# Patient Record
Sex: MALE | Race: WHITE | NOT HISPANIC OR LATINO | Employment: OTHER | URBAN - METROPOLITAN AREA
[De-identification: names, ages, dates, MRNs, and addresses within clinical notes are randomized per-mention and may not be internally consistent; named-entity substitution may affect disease eponyms.]

---

## 2017-06-30 ENCOUNTER — OFFICE VISIT - RIVER FALLS (OUTPATIENT)
Dept: FAMILY MEDICINE | Facility: CLINIC | Age: 62
End: 2017-06-30

## 2017-06-30 ASSESSMENT — MIFFLIN-ST. JEOR: SCORE: 1627.42

## 2017-07-01 LAB
CHOLEST SERPL-MCNC: 168 MG/DL (ref 125–200)
CHOLEST/HDLC SERPL: 4 {RATIO}
GLUCOSE BLD-MCNC: 93 MG/DL (ref 65–99)
HDLC SERPL-MCNC: 42 MG/DL
LDLC SERPL CALC-MCNC: 105 MG/DL
NONHDLC SERPL-MCNC: 126 MG/DL
TRIGL SERPL-MCNC: 107 MG/DL

## 2017-10-17 ENCOUNTER — OFFICE VISIT - RIVER FALLS (OUTPATIENT)
Dept: FAMILY MEDICINE | Facility: CLINIC | Age: 62
End: 2017-10-17

## 2018-06-29 ENCOUNTER — COMMUNICATION - RIVER FALLS (OUTPATIENT)
Dept: FAMILY MEDICINE | Facility: CLINIC | Age: 63
End: 2018-06-29

## 2018-06-29 ENCOUNTER — OFFICE VISIT - RIVER FALLS (OUTPATIENT)
Dept: FAMILY MEDICINE | Facility: CLINIC | Age: 63
End: 2018-06-29

## 2018-06-29 ASSESSMENT — MIFFLIN-ST. JEOR: SCORE: 1594.77

## 2018-07-02 LAB
CHOLEST SERPL-MCNC: 152 MG/DL
CHOLEST/HDLC SERPL: 3.6 {RATIO}
GLUCOSE BLD-MCNC: 102 MG/DL (ref 65–99)
HDLC SERPL-MCNC: 42 MG/DL
LDLC SERPL CALC-MCNC: 89 MG/DL
NONHDLC SERPL-MCNC: 110 MG/DL
PSA SERPL-MCNC: 1 NG/ML
TRIGL SERPL-MCNC: 113 MG/DL

## 2019-01-09 ENCOUNTER — OFFICE VISIT - RIVER FALLS (OUTPATIENT)
Dept: FAMILY MEDICINE | Facility: CLINIC | Age: 64
End: 2019-01-09

## 2019-01-09 ASSESSMENT — MIFFLIN-ST. JEOR: SCORE: 1602.02

## 2019-03-20 ENCOUNTER — AMBULATORY - RIVER FALLS (OUTPATIENT)
Dept: FAMILY MEDICINE | Facility: CLINIC | Age: 64
End: 2019-03-20

## 2019-08-27 ENCOUNTER — OFFICE VISIT - RIVER FALLS (OUTPATIENT)
Dept: FAMILY MEDICINE | Facility: CLINIC | Age: 64
End: 2019-08-27

## 2020-02-05 ENCOUNTER — OFFICE VISIT - RIVER FALLS (OUTPATIENT)
Dept: FAMILY MEDICINE | Facility: CLINIC | Age: 65
End: 2020-02-05

## 2020-02-05 ASSESSMENT — MIFFLIN-ST. JEOR: SCORE: 1613.82

## 2020-02-06 ENCOUNTER — COMMUNICATION - RIVER FALLS (OUTPATIENT)
Dept: FAMILY MEDICINE | Facility: CLINIC | Age: 65
End: 2020-02-06

## 2020-02-06 LAB
CHOLEST SERPL-MCNC: 158 MG/DL
CHOLEST/HDLC SERPL: 3.8 {RATIO}
GLUCOSE BLD-MCNC: 97 MG/DL (ref 65–99)
HDLC SERPL-MCNC: 42 MG/DL
LDLC SERPL CALC-MCNC: 96 MG/DL
NONHDLC SERPL-MCNC: 116 MG/DL
PSA SERPL-MCNC: 1.2 NG/ML
TRIGL SERPL-MCNC: 106 MG/DL

## 2020-02-13 ENCOUNTER — RECORDS - HEALTHEAST (OUTPATIENT)
Dept: ADMINISTRATIVE | Facility: OTHER | Age: 65
End: 2020-02-13

## 2020-03-16 ENCOUNTER — HOSPITAL ENCOUNTER (OUTPATIENT)
Dept: CT IMAGING | Facility: CLINIC | Age: 65
Discharge: HOME | End: 2020-03-16

## 2020-03-16 DIAGNOSIS — E78.5 HLD (HYPERLIPIDEMIA): ICD-10-CM

## 2020-03-16 LAB
BSA FOR ECHO PROCEDURE: 1.99 M2
CV CALCIUM SCORE AGATSTON LM: 4
CV CALCIUM SCORING AGATSON LAD: 2
CV CALCIUM SCORING AGATSTON CX: 0
CV CALCIUM SCORING AGATSTON RCA: 14
CV CALCIUM SCORING AGATSTON TOTAL: 20

## 2020-03-16 ASSESSMENT — MIFFLIN-ST. JEOR: SCORE: 1576.85

## 2020-08-17 ENCOUNTER — OFFICE VISIT - RIVER FALLS (OUTPATIENT)
Dept: FAMILY MEDICINE | Facility: CLINIC | Age: 65
End: 2020-08-17

## 2020-08-17 ASSESSMENT — MIFFLIN-ST. JEOR: SCORE: 1630.15

## 2020-08-18 LAB — B BURGDOR IGG+IGM SER QL: <0.9

## 2021-04-19 ENCOUNTER — OFFICE VISIT - RIVER FALLS (OUTPATIENT)
Dept: FAMILY MEDICINE | Facility: CLINIC | Age: 66
End: 2021-04-19

## 2021-04-19 ASSESSMENT — MIFFLIN-ST. JEOR: SCORE: 1632.87

## 2021-06-04 VITALS — BODY MASS INDEX: 26.66 KG/M2 | HEIGHT: 69 IN | WEIGHT: 180 LBS

## 2021-06-17 ENCOUNTER — OFFICE VISIT - RIVER FALLS (OUTPATIENT)
Dept: FAMILY MEDICINE | Facility: CLINIC | Age: 66
End: 2021-06-17

## 2021-06-17 ASSESSMENT — MIFFLIN-ST. JEOR: SCORE: 1631.51

## 2021-06-18 ENCOUNTER — COMMUNICATION - RIVER FALLS (OUTPATIENT)
Dept: FAMILY MEDICINE | Facility: CLINIC | Age: 66
End: 2021-06-18

## 2021-06-18 LAB
CHOLEST SERPL-MCNC: 260 MG/DL
CHOLEST/HDLC SERPL: 6.2 {RATIO}
GLUCOSE BLD-MCNC: 73 MG/DL (ref 65–99)
HDLC SERPL-MCNC: 42 MG/DL
LDLC SERPL CALC-MCNC: 184 MG/DL
NONHDLC SERPL-MCNC: 218 MG/DL
PSA SERPL-MCNC: 1.2 NG/ML
TRIGL SERPL-MCNC: 189 MG/DL

## 2022-02-11 VITALS
SYSTOLIC BLOOD PRESSURE: 116 MMHG | DIASTOLIC BLOOD PRESSURE: 73 MMHG | HEIGHT: 68 IN | HEART RATE: 73 BPM | WEIGHT: 193.4 LBS | TEMPERATURE: 97.6 F | BODY MASS INDEX: 29.31 KG/M2

## 2022-02-11 VITALS
DIASTOLIC BLOOD PRESSURE: 68 MMHG | HEIGHT: 68 IN | HEART RATE: 82 BPM | BODY MASS INDEX: 29.86 KG/M2 | WEIGHT: 197 LBS | TEMPERATURE: 98.6 F | SYSTOLIC BLOOD PRESSURE: 112 MMHG

## 2022-02-11 VITALS
DIASTOLIC BLOOD PRESSURE: 77 MMHG | BODY MASS INDEX: 29.77 KG/M2 | SYSTOLIC BLOOD PRESSURE: 110 MMHG | HEIGHT: 68 IN | HEART RATE: 71 BPM | WEIGHT: 196.4 LBS | TEMPERATURE: 97.4 F

## 2022-02-11 VITALS
OXYGEN SATURATION: 93 % | HEIGHT: 68 IN | DIASTOLIC BLOOD PRESSURE: 68 MMHG | SYSTOLIC BLOOD PRESSURE: 110 MMHG | BODY MASS INDEX: 29.95 KG/M2 | WEIGHT: 197.6 LBS | HEART RATE: 84 BPM | TEMPERATURE: 98.6 F

## 2022-02-11 VITALS
WEIGHT: 193.8 LBS | DIASTOLIC BLOOD PRESSURE: 84 MMHG | TEMPERATURE: 97.9 F | HEART RATE: 71 BPM | SYSTOLIC BLOOD PRESSURE: 122 MMHG | BODY MASS INDEX: 29.91 KG/M2

## 2022-02-11 VITALS
DIASTOLIC BLOOD PRESSURE: 79 MMHG | WEIGHT: 189.2 LBS | HEIGHT: 68 IN | BODY MASS INDEX: 28.67 KG/M2 | TEMPERATURE: 97 F | HEART RATE: 65 BPM | SYSTOLIC BLOOD PRESSURE: 118 MMHG

## 2022-02-11 VITALS
HEIGHT: 68 IN | HEART RATE: 66 BPM | WEIGHT: 190.8 LBS | DIASTOLIC BLOOD PRESSURE: 81 MMHG | BODY MASS INDEX: 28.92 KG/M2 | SYSTOLIC BLOOD PRESSURE: 137 MMHG | TEMPERATURE: 97.3 F

## 2022-02-11 VITALS
WEIGHT: 197.3 LBS | DIASTOLIC BLOOD PRESSURE: 83 MMHG | HEART RATE: 71 BPM | HEIGHT: 68 IN | SYSTOLIC BLOOD PRESSURE: 133 MMHG | BODY MASS INDEX: 29.9 KG/M2 | TEMPERATURE: 97.9 F

## 2022-02-11 VITALS
WEIGHT: 195 LBS | TEMPERATURE: 98.1 F | SYSTOLIC BLOOD PRESSURE: 122 MMHG | DIASTOLIC BLOOD PRESSURE: 74 MMHG | BODY MASS INDEX: 30.09 KG/M2 | HEART RATE: 68 BPM

## 2022-02-16 NOTE — NURSING NOTE
Comprehensive Intake Entered On:  2/5/2020 7:55 AM CST    Performed On:  2/5/2020 7:53 AM CST by Cayla Crawley               Summary   Chief Complaint :   Px   Weight Measured :   193.4 lb(Converted to: 193 lb 6 oz, 87.72 kg)    Height Measured :   67.5 in(Converted to: 5 ft 7 in, 171.45 cm)    Body Mass Index :   29.84 kg/m2 (HI)    Body Surface Area :   2.04 m2   Systolic Blood Pressure :   116 mmHg   Diastolic Blood Pressure :   73 mmHg   Mean Arterial Pressure :   87 mmHg   Peripheral Pulse Rate :   73 bpm   BP Method :   Electronic   HR Method :   Electronic   Temperature Tympanic :   97.6 DegF(Converted to: 36.4 DegC)  (LOW)    Cayla Crawley - 2/5/2020 7:53 AM CST   Health Status   Allergies Verified? :   Yes   Medication History Verified? :   Yes   Pre-Visit Planning Status :   Completed   Tobacco Use? :   Never smoker   Cayla Crawley - 2/5/2020 7:53 AM CST

## 2022-02-16 NOTE — PROGRESS NOTES
Patient:   YENI ANGULO            MRN: [id number]            FIN: [id number]               Age:   63 years     Sex:  Male     :  1955   Associated Diagnoses:   Annual exam; HLD (Hyperlipidemia); Family History of Prostate Cancer; DJD (Degenerative Joint Disease); Asthma, Mild Intermittent; Family History of Colon Cancer; Injury of shoulder, left   Author:   Will Morocho MD      Visit Information      Date of Service: 2019 04:20 pm  Performing Location: [facility]  Encounter#: [id number]      Primary Care Provider (PCP):  Will Morocho MD    NPLENORA# [id number]      Referring Provider:  Will Morocho MD# [id number]      Chief Complaint   2019 4:41 PM CST     Px     Routine Health Care Visit      History of Present Illness   Doing well  Injured left shoulder several yrs ago siiing Loss of motion getting worse   Meds as directed             The patient presents for a general exam.  The patient's general health status is described as good.  The patient's diet is described as balanced.  Exercise:  none.  Associated symptoms consist of none.  Additional pertinent history: occasional caffeine use, tobacco use none and alcohol use socially.  no inhaler use 3 years.        Review of Systems   Constitutional:  Negative.    Eye:  Negative.    Ear/Nose/Mouth/Throat:  Negative.    Respiratory:  Negative.    Cardiovascular:  Negative.    Gastrointestinal:  Negative.    Genitourinary:  Negative.    Hematology/Lymphatics:  Negative.    Endocrine:  Negative.    Immunologic:  Negative.    Musculoskeletal:  Negative.    Integumentary:  Negative.    Neurologic:  Negative.    Psychiatric:  Negative.    All other systems reviewed and negative      Health Status   Allergies:    Allergic Reactions (Selected)  No known allergies   Medications:  (Selected)   Prescriptions  Prescribed  ZyrTEC 10 mg oral tablet: 1 tab(s) ( 10 mg ), po, daily, # 90 tab(s), 3 Refill(s), Type: Maintenance,  Pharmacy: [facility], 1 tab(s) Oral daily  simvastatin 40 mg oral tablet: 1 tab(s) ( 40 mg ), po, hs, # 90 tab(s), 3 Refill(s), Type: Maintenance, Pharmacy: [facility], 1 tab(s) Oral hs  Documented Medications  Documented  Daily Multiple Vitamins oral tablet: 1 tab(s), po, daily, 0 Refill(s), Type: Maintenance  aspirin 81 mg oral tablet: 1 tab(s) ( 81 mg ), po, daily, tab(s), 0 Refill(s), Type: Maintenance,    Medications          *denotes recorded medication          *aspirin 81 mg oral tablet: 81 mg, 1 tab(s), po, daily, tab(s).          ZyrTEC 10 mg oral tablet: 10 mg, 1 tab(s), po, daily, 90 tab(s), 3 Refill(s).          *Daily Multiple Vitamins oral tablet: 1 tab(s), po, daily.          simvastatin 40 mg oral tablet: 40 mg, 1 tab(s), po, hs, 90 tab(s), 3 Refill(s).   Problem list:    All Problems  Adenomatous colon polyp / SNOMED CT 9903338689 / Confirmed  Family history of colon cancer / SNOMED CT 760261054 / Confirmed  Hearing loss / SNOMED CT 13503981 / Confirmed  HLD (hyperlipidemia) / SNOMED CT 65917569 / Confirmed  Asthma, mild intermittent / SNOMED CT 0998062726 / Confirmed  Obesity / SNOMED CT 6460355893 / Probable  DJD (degenerative joint disease) / SNOMED CT 1720682472 / Confirmed  Seasonal allergies / SNOMED CT 511938127 / Confirmed  Skin lesions / SNOMED CT 912432291 / Confirmed  Squamous cell carcinoma in situ / SNOMED CT 488434304 / Confirmed  Resolved: Anxiety / SNOMED CT 8375509710  Resolved: CT Cardiac Calcium Score      Histories   Past Medical History:    Active  Squamous cell carcinoma in situ (107070287): Onset on 1/6/2017 at 61 years.  Comments:  1/11/2017 CST 12:54 PM Elisa Serrano  Skin, vale of antihelix, right, superior.  Skin lesions (558790612): Onset on 1/5/2017 at 61 years.  Comments:  1/11/2017 CST 12:52 PM Elisa Serrano  Lentigines, benign nevi, seborrheic keratoses, cherry angiomas.  Asthma, mild intermittent  (9578933113): Onset on 2011 at 55 years.  Comments:  2011 CST 3:34 PM CST -     HLD (hyperlipidemia) (52936946)  Family history of colon cancer (049529011)  DJD (degenerative joint disease) (8860047260)  Obesity (3667966135)  Seasonal allergies (491990129)  Hearing loss (37973829)  Resolved  Anxiety (0235801295):  Resolved.  CT Cardiac Calcium Score:  Resolved.  Comments:  2013 CDT 6:03 PM CDT - Juarez Cayla ERAZO  Date of test: 2013   Score: 1   Family History:    Cancer  Father (Chava, )  Comments:  2011 3:32 PM CST - Allen TATUM, Emmnauelle  Bone  CA - Cancer of colon  Grandfather (P)  Comments:  2011 11:02 AM CST - Elisa Aguilera  In his 60s.  Cancer of colon  Mother ()  Cancer of prostate  Father (Chava, )     Procedure history:    Shave biopsy of skin lesion (SNOMED CT 381150733) on 2017 at 61 Years.  Comments:  2017 12:56 PM CST Elisa Gamez  Skin, vale of antihelix, right, superior revealed squamous cell ca in situ.    2017 12:49 PM Elisa Serrano  Left distal calf, right knee, right superior vale of antihelix, right lateral temple.  Colonoscopy (SNOMED CT 322332670) performed by Hernandez Alford MD on 3/25/2015 at 60 Years.  Comments:  3/26/2015 3:41 PM CDT - Hernandez Alford MD  Indication: First degree relative with colon cancer <60  Sedation: 3 mg Versed, 150 mcg Fentanyl  Findings: Single divertiulum, Small tubular adenoma 50 cm  Repeat in 5 years  Colonoscopy (SNOMED CT 727386558) in the month of 2009 at 54 Years.  Appendectomy (SNOMED CT 126894551).  Right Knee Arthroscopy (open).   Social History:        Alcohol Assessment            Current, 4-5 times per week, 2 drinks/episode average.      Tobacco Assessment            Former smoker      Substance Abuse Assessment            Past, Marijuana      Employment and Education Assessment            Employed, Work/School description: .      Home and Environment  Assessment            Marital status: .  Spouse/Partner name: Elisa.  Risks in environment: owns secured gun.      Nutrition and Health Assessment            Type of diet: Regular.      Exercise and Physical Activity Assessment            Exercise frequency: Never.      Physical Examination   Vital Signs   1/9/2019 4:41 PM CST Temperature Tympanic 97.3 DegF  LOW    Peripheral Pulse Rate 66 bpm    HR Method Electronic    Systolic Blood Pressure 137 mmHg  HI    Diastolic Blood Pressure 81 mmHg  HI    Mean Arterial Pressure 100 mmHg    BP Method Electronic      Measurements from flowsheet : Measurements   1/9/2019 4:41 PM CST Height Measured - Standard 67.5 in    Weight Measured - Standard 190.8 lb    BSA 2.03 m2    Body Mass Index 29.44 kg/m2  HI      General:  Alert and oriented.    Eye:  Pupils are equal, round and reactive to light, Normal conjunctiva.    HENT:  Normocephalic, Tympanic membranes are clear, Oral mucosa is moist.    Neck:  Supple, Non-tender, No lymphadenopathy, No thyromegaly.    Respiratory:  Breath sounds are equal, Symmetrical chest wall expansion.         Respirations: Are within normal limits.         Pattern: Regular.         Breath sounds: Bilateral, Within normal limits.    Cardiovascular:  Normal rate, Regular rhythm, No murmur, Good pulses equal in all extremities, Normal peripheral perfusion, No edema.    Breast:  No mass.         Lymph nodes: Within normal limits.    Gastrointestinal:  Soft, Non-tender, Non-distended, Normal bowel sounds.    Musculoskeletal:  degenerative changes noted.         Upper extremity exam: Shoulder ( Left, Tenderness, Range of motion ( Diminished ), pain with rtc stressing ).    Integumentary:  Warm, Dry.    Neurologic:  No focal deficits.    Cognition and Speech:  Oriented, Speech clear and coherent.    Psychiatric:  Appropriate mood & affect, Normal judgment.       Health Maintenance      Recommendations     Pending (in the next year)        Due             Abdominal Aortic Aneurysm Screen (if hx of smoking) due  01/09/19  One-time only           Fall Risk Screen (Male) due  01/09/19  and every 1  year(s)           Hepatitis C Screen 3307-1413 (Male) due  01/09/19  One-time only           Lung Cancer Screen (Male) due  01/09/19  and every 1  year(s)           Type 2 Diabetes Mellitus Screen (Male) due  01/09/19  Variable frequency           Zoster/Shingles Vaccine due  01/09/19  One-time only        Due In Future            Depression Screen (Male) not due until  06/29/19  and every 1  year(s)           Lipid Disorders Screen (Male) not due until  06/29/19  and every 1  year(s)           Influenza Vaccine not due until  12/01/19  and every 1  year(s)     Satisfied (in the past 1 year)        Satisfied            Alcohol Misuse Screen (Male) on  06/29/18.           Body Mass Index Check (Male) on  01/09/19.           Body Mass Index Check (Male) on  06/29/18.           Depression Screen (Male) on  06/29/18.           Depression Screen (Male) on  06/29/18.           Depression Screen (Male) on  06/29/18.           High Blood Pressure Screen (Male) on  01/09/19.           High Blood Pressure Screen (Male) on  06/29/18.           Influenza Vaccine on  12/01/18.           Lipid Disorders Screen (Male) on  06/29/18.           Lipid Disorders Screen (Male) on  06/29/18.           Lipid Disorders Screen (Male) on  06/29/18.           Lipid Disorders Screen (Male) on  06/29/18.           Obesity Screen and Counseling (Male) on  01/09/19.           Obesity Screen and Counseling (Male) on  06/29/18.           Pneumococcal Vaccine on  06/29/18.           Tobacco Use Screen (Male) on  01/09/19.           Tobacco Use Screen (Male) on  06/29/18.      Impression and Plan   Diagnosis     Annual exam (LQP51-BN Z00.00).     HLD (Hyperlipidemia) (BMV67-EF E78.5).     Family History of Prostate Cancer (GCO81-VS Z80.42).     DJD (Degenerative Joint Disease) (RPX76-LB Q89.8).     Asthma,  Mild Intermittent (OSL55-BI J45.20).     Family History of Colon Cancer (GSY84-ME Z80.0).     Injury of shoulder, left (XHQ02-NB S49.92XA).     Course:  Not progressing as expected.    Plan:  uses inh seasonally not needed for 3 years  doing great  BMI,Weight loss,exercise,diet discussed  cage/phq9 reviewed with patient  mri shoulder.    Patient Instructions:       Counseled: Patient, Diet, Activity, Verbalized understanding.    Counseled:  Patient, Routine exercise and healthy weight maintenance discussed.    Patient Instructions:  Return to clinic in one year for next routine health visit, or sooner if problems or concerns.

## 2022-02-16 NOTE — PROGRESS NOTES
Patient:   YENI ANGULO            MRN: [id number]            FIN: [id number]               Age:   63 years     Sex:  Male     :  1955   Associated Diagnoses:   Annual exam; HLD (Hyperlipidemia); Family History of Prostate Cancer; DJD (Degenerative Joint Disease); Asthma, Mild Intermittent; Family History of Colon Cancer   Author:   Will Morocho MD      Visit Information      Date of Service: 2018 07:57 am  Performing Location: [facility]  Encounter#: [id number]      Primary Care Provider (PCP):  Will Morocho MD    NPLENORA# [id number]      Referring Provider:  Will Morocho MD# [id number]      Chief Complaint   2018 8:15 AM CDT    Px     Routine Health Care Visit      History of Present Illness   Doing well   no concerns   Meds as directed             The patient presents for a general exam.  The patient's general health status is described as good.  The patient's diet is described as balanced.  Exercise:  none.  Associated symptoms consist of none.  Additional pertinent history: occasional caffeine use, tobacco use none and alcohol use socially.  no inhaler use 3 years.        Review of Systems   Constitutional:  Negative.    Eye:  Negative.    Ear/Nose/Mouth/Throat:  Negative.    Respiratory:  Negative.    Cardiovascular:  Negative.    Gastrointestinal:  Negative.    Genitourinary:  Negative.    Hematology/Lymphatics:  Negative.    Endocrine:  Negative.    Immunologic:  Negative.    Musculoskeletal:  Negative.    Integumentary:  Negative.    Neurologic:  Negative.    Psychiatric:  Negative.    All other systems reviewed and negative      Health Status   Allergies:    Allergic Reactions (Selected)  No known allergies   Medications:  (Selected)   Prescriptions  Prescribed  ZyrTEC 10 mg oral tablet: 1 tab(s) ( 10 mg ), po, daily, # 90 tab(s), 3 Refill(s), Type: Maintenance, Pharmacy: [facility], 1 tab(s) po daily  simvastatin 40 mg oral  tablet: 1 tab(s) ( 40 mg ), po, hs, # 90 tab(s), 3 Refill(s), Type: Maintenance, Pharmacy: [facility] [place], 1 tab(s) po hs  Documented Medications  Documented  Daily Multiple Vitamins oral tablet: 1 tab(s), po, daily, 0 Refill(s), Type: Maintenance  aspirin 81 mg oral tablet: 1 tab(s) ( 81 mg ), po, daily, tab(s), 0 Refill(s), Type: Maintenance,    Medications          *denotes recorded medication          *aspirin 81 mg oral tablet: 81 mg, 1 tab(s), po, daily, tab(s).          ZyrTEC 10 mg oral tablet: 10 mg, 1 tab(s), po, daily, 90 tab(s), 3 Refill(s).          *Daily Multiple Vitamins oral tablet: 1 tab(s), po, daily.          simvastatin 40 mg oral tablet: 40 mg, 1 tab(s), po, hs, 90 tab(s), 3 Refill(s).     Problem list:    All Problems (Selected)  Adenomatous colon polyp / SNOMED CT 6423735997 / Confirmed  Family history of colon cancer / SNOMED CT 143125381 / Confirmed  Hearing loss / SNOMED CT 94013826 / Confirmed  HLD (hyperlipidemia) / SNOMED CT 88144782 / Confirmed  Asthma, mild intermittent / SNOMED CT 8840825495 / Confirmed  Obesity / SNOMED CT 2721440352 / Probable  DJD (degenerative joint disease) / SNOMED CT 5117100912 / Confirmed  Seasonal allergies / SNOMED CT 711855977 / Confirmed  Skin lesions / SNOMED CT 765936677 / Confirmed  Squamous cell carcinoma in situ / SNOMED CT 977666165 / Confirmed      Histories   Past Medical History:    Active  Squamous cell carcinoma in situ (511323319): Onset on 1/6/2017 at 61 years.  Comments:  1/11/2017 CST 12:54 PM CST - Elisa Aguilera  Skin, vale of antihelix, right, superior.  Skin lesions (036490570): Onset on 1/5/2017 at 61 years.  Comments:  1/11/2017 CST 12:52 PM CST - Elisa Aguilera  Lentigines, benign nevi, seborrheic keratoses, cherry angiomas.  Asthma, mild intermittent (3810146188): Onset on 1/21/2011 at 55 years.  Comments:  1/21/2011 CST 3:34 PM CST -     HLD (hyperlipidemia) (18577224)  Family history of colon cancer  (956094765)  DJD (degenerative joint disease) (9246621645)  Obesity (0781806238)  Seasonal allergies (797433517)  Hearing loss (23760552)  Resolved  Anxiety (3092128330):  Resolved.  CT Cardiac Calcium Score:  Resolved.  Comments:  2013 CDT 6:03 PM CDT - Cayla Crawley  Date of test: 2013   Score: 1   Family History:    Cancer  Father (Chava, )  Comments:  2011 3:32 PM - Allen TATUM, Emmanuelle  Bone  CA - Cancer of colon  Grandfather (P)  Comments:  2011 11:02 AM - Elisa Aguilera  In his 60s.  Cancer of colon  Mother ()  Cancer of prostate  Father (Chava, )     Procedure history:    Shave biopsy of skin lesion (SNOMED CT 744193565) on 2017 at 61 Years.  Comments:  2017 12:56 PM - Elisa Aguilera  Skin, vale of antihelix, right, superior revealed squamous cell ca in situ.    2017 12:49 PM - Elisa Aguilera  Left distal calf, right knee, right superior vale of antihelix, right lateral temple.  Colonoscopy (SNOMED CT 893155531) performed by Hernandez Alford MD on 3/25/2015 at 60 Years.  Comments:  3/26/2015 3:41 PM - Hernandez Alford MD  Indication: First degree relative with colon cancer <60  Sedation: 3 mg Versed, 150 mcg Fentanyl  Findings: Single divertiulum, Small tubular adenoma 50 cm  Repeat in 5 years  Colonoscopy (SNOMED CT 879478604) in the month of 2009 at 54 Years.  Appendectomy (SNOMED CT 137665945).  Right Knee Arthroscopy (open).   Social History:        Alcohol Assessment            Current, 3-4 times per week, 2 drinks/episode average.  3 drinks/episode maximum.      Tobacco Assessment            Former smoker      Substance Abuse Assessment            Past, Marijuana      Employment and Education Assessment            Employed, Work/School description: .      Home and Environment Assessment            Marital status: .  Spouse/Partner name: Elisa.  Risks in environment: owns secured gun.      Nutrition and Health  Assessment            Type of diet: Regular.      Exercise and Physical Activity Assessment            Exercise frequency: Never.        Physical Examination   Vital Signs   6/29/2018 8:15 AM CDT Temperature Tympanic 97.0 DegF  LOW    Peripheral Pulse Rate 65 bpm    HR Method Electronic    Systolic Blood Pressure 118 mmHg    Diastolic Blood Pressure 79 mmHg    Mean Arterial Pressure 92 mmHg    BP Method Electronic      Measurements from flowsheet : Measurements   6/29/2018 8:15 AM CDT Height Measured - Standard 67.5 in    Weight Measured - Standard 189.2 lb    BSA 2.02 m2    Body Mass Index 29.19 kg/m2  HI      General:  Alert and oriented.    Eye:  Pupils are equal, round and reactive to light, Normal conjunctiva.    HENT:  Normocephalic, Tympanic membranes are clear, Oral mucosa is moist.    Neck:  Supple, Non-tender, No lymphadenopathy, No thyromegaly.    Respiratory:  Breath sounds are equal, Symmetrical chest wall expansion.         Respirations: Are within normal limits.         Pattern: Regular.         Breath sounds: Bilateral, Within normal limits.    Cardiovascular:  Normal rate, Regular rhythm, No murmur, Good pulses equal in all extremities, Normal peripheral perfusion, No edema.    Breast:  No mass.         Lymph nodes: Within normal limits.    Gastrointestinal:  Soft, Non-tender, Non-distended, Normal bowel sounds.    Musculoskeletal:  No tenderness, No swelling, degenerative changes noted.    Integumentary:  Warm, Dry.    Neurologic:  No focal deficits.    Cognition and Speech:  Oriented, Speech clear and coherent.    Psychiatric:  Appropriate mood & affect, Normal judgment.       Health Maintenance      Recommendations     Pending (in the next year)        OverDue           Influenza Vaccine due  12/10/17  and every 1  year(s)        Due            Abdominal Aortic Aneurysm Screen (if hx of smoking) due  06/29/18  One-time only           Fall Risk Screen (Male) due  06/29/18  and every 1  year(s)            Hepatitis C Screen 1843-1199 (Male) due  06/29/18  One-time only           Lung Cancer Screen (Male) due  06/29/18  and every 1  year(s)           Zoster/Shingles Vaccine due  06/29/18  One-time only        Near Due            Lipid Disorders Screen (Male) near due  06/30/18  and every 1  year(s)           Depression Screen (Male) near due  06/30/18  and every 1  year(s)     Satisfied (in the past 1 year)        Satisfied            Alcohol Misuse Screen (Male) on  06/30/17.           Body Mass Index Check (Male) on  06/29/18.           Body Mass Index Check (Male) on  06/30/17.           Depression Screen (Male) on  06/30/17.           Depression Screen (Male) on  06/30/17.           Depression Screen (Male) on  06/30/17.           High Blood Pressure Screen (Male) on  06/29/18.           High Blood Pressure Screen (Male) on  10/17/17.           High Blood Pressure Screen (Male) on  06/30/17.           Lipid Disorders Screen (Male) on  06/30/17.           Lipid Disorders Screen (Male) on  06/30/17.           Lipid Disorders Screen (Male) on  06/30/17.           Lipid Disorders Screen (Male) on  06/30/17.           Obesity Screen and Counseling (Male) on  06/29/18.           Obesity Screen and Counseling (Male) on  10/17/17.           Obesity Screen and Counseling (Male) on  06/30/17.           Tobacco Use Screen (Male) on  06/29/18.           Tobacco Use Screen (Male) on  10/17/17.           Tobacco Use Screen (Male) on  06/30/17.          Impression and Plan   Diagnosis     Annual exam (ZPN10-WV Z00.00).     HLD (Hyperlipidemia) (DBD95-VU E78.5).     Family History of Prostate Cancer (JNJ48-SN Z80.42).     DJD (Degenerative Joint Disease) (ISZ26-JV Q89.8).     Asthma, Mild Intermittent (BEF61-EP J45.20).     Family History of Colon Cancer (TFZ01-DJ Z80.0).     Plan:  uses inh seasonally not needed for 3 years  doing great  BMI,Weight loss,exercise,diet discussed  cage/phq9 reviewed with patient.    Patient  Instructions:       Counseled: Patient, Diet, Activity, Verbalized understanding.    Counseled:  Patient, Routine exercise and healthy weight maintenance discussed.    Patient Instructions:  Return to clinic in one year for next routine health visit, or sooner if problems or concerns.

## 2022-02-16 NOTE — TELEPHONE ENCOUNTER
---------------------  From: Franci Flannery CMA   Sent: 8/2/2019 9:26:55 AM CDT  Subject: General Message-Shinrix     LM for pt at 0926 to c/b to set up his 2nd Shinrix vaccine

## 2022-02-16 NOTE — TELEPHONE ENCOUNTER
---------------------  From: Abbey Dvais CMA (Phone Messages Pool ([id number]_[facility]))   Sent: 8/3/2020 9:24:32 AM CDT  Subject: vaccines     Phone message    PCP: DIANDRA     Person calling: Hubert  Phone number: [phone]  Time message left: 0839  Return call time: 0920    Reason: Hubert called and left a message wondering when his last shingles vaccine, pneumonia and flu were given.   Shingles: 3/20/19 and 8/27/19  Flu: 12/20/2019  pneumonia: pneumovax 3/17/2003 and Prevnar 6/29/2018    Hubert was notified and will mail copy of vaccines per request.       Transferred to:             IRIS Davis CMA

## 2022-02-16 NOTE — NURSING NOTE
Depression Screening Entered On:  1/10/2019 8:36 AM CST    Performed On:  1/9/2019 8:36 AM CST by Akua Pelayo               Depression Screening   Feeling Down, Depressed, Hopeless :   Not at all   Little Interest - Pleasure in Activities :   Not at all   Initial Depression Screen Score :   0    Trouble Falling or Staying Asleep :   Several days   Feeling Tired or Little Energy :   Not at all   Poor Appetite or Overeating :   Not at all   Feeling Bad About Yourself :   Not at all   Trouble Concentrating :   Not at all   Moving or Speaking Slowly :   Not at all   Thoughts Better Off Dead or Hurting Self :   Not at all   Detailed Depression Screen Score :   1    Total Depression Screen Score :   1    VERONIQUE Difficulty with Work, Home, Others :   Not difficult at all   Akua Pelayo - 1/10/2019 8:36 AM CST

## 2022-02-16 NOTE — TELEPHONE ENCOUNTER
Patient Information     Name:YENI ANGULO      Address:      [id number] [place] [id number]     Sex:Male     YOB: 1955     [phone]     Emergency Contact:ANTIONE ANGULO     MRN:[id number]     FIN:[id number]     Location:[facility]     Date of Service:08/17/2020      Primary Care Physician:       Will Morocho MD, [phone]      Attending Physician:       Estephania Abrams PA-C, [phone]   Pt notified by phone of negative lyme test.

## 2022-02-16 NOTE — LETTER
(Inserted Image. Unable to display)     July 22, 2019      YENI ANGULO  [id number], [place] [id number]          Dear YENI,      Thank you for selecting [facility] (previously [facility]) for your healthcare needs.      Our records indicate you are due for the following services:     Immunizations:  Shingrix vaccine.      To schedule an appointment or if you have further questions, please contact your primary clinic:   [facility]       [phone]   [facility]       [phone]              [facility]     [phone]      Powered by [employer] and [employer]    Sincerely,    Will Morocho MD

## 2022-02-16 NOTE — PROGRESS NOTES
Patient:   YENI ANGULO            MRN: [id number]            FIN: [id number]               Age:   66 years     Sex:  Male     :  1955   Associated Diagnoses:   Annual exam; HLD (Hyperlipidemia); Family History of Prostate Cancer; DJD (Degenerative Joint Disease); Asthma, Mild Intermittent; Family History of Colon Cancer   Author:   Will Morocho MD      Visit Information      Date of Service: 2021 07:42 am  Performing Location: [facility]  Encounter#: [id number]      Primary Care Provider (PCP):  Will Morocho MD    NPI# [id number]      Referring Provider:  Will Morocho MD# [id number]      Chief Complaint   2021 7:51 AM CDT    Px     Routine Health Care Visit      History of Present Illness   Patient is in today for annual exam and follow-up.  Overall he is doing great tolerating atorvastatin no issues with his breathing allergies are well controlled continues to be very active still working full-time wife is in good health.  No tobacco use         Review of Systems   Constitutional:  Negative.    Eye:  Negative.    Ear/Nose/Mouth/Throat:  Negative.    Respiratory:  Negative.    Cardiovascular:  Negative.    Gastrointestinal:  Negative.    Genitourinary:  Negative.    Hematology/Lymphatics:  Negative.    Endocrine:  Negative.    Immunologic:  Negative.    Musculoskeletal:  Negative.    Integumentary:  Negative.    Neurologic:  Negative.    Psychiatric:  Negative.    All other systems reviewed and negative      Health Status   Allergies:    Allergic Reactions (Selected)  No known allergies   Medications:  (Selected)   Prescriptions  Prescribed  ZyrTEC 10 mg oral tablet: = 1 tab(s) ( 10 mg ), po, daily, # 90 tab(s), 3 Refill(s), Type: Maintenance, Pharmacy: [facility], 1 tab(s) Oral daily, 67.5, in, 21 7:51:00 CDT, Height Measured, 197.3, lb, 21 7:51:00 CDT, Weight Measured  atorvastatin 40 mg oral tablet: = 1 tab(s),  Oral, daily, # 90 tab(s), 3 Refill(s), Type: Maintenance, Pharmacy: [facility], 1 tab(s) Oral daily, 67.5, in, 06/17/21 7:51:00 CDT, Height Measured, 197.3, lb, 06/17/21 7:51:00 CDT, Weight Measured  Documented Medications  Documented  Daily Multiple Vitamins oral tablet: 1 tab(s), po, daily, 0 Refill(s), Type: Maintenance  aspirin 81 mg oral tablet: 1 tab(s) ( 81 mg ), po, daily, tab(s), 0 Refill(s), Type: Maintenance,    Medications          *denotes recorded medication          *aspirin 81 mg oral tablet: 81 mg, 1 tab(s), po, daily, tab(s).          atorvastatin 40 mg oral tablet: 1 tab(s), Oral, daily, 90 tab(s), 3 Refill(s).          ZyrTEC 10 mg oral tablet: 10 mg, 1 tab(s), po, daily, 90 tab(s), 3 Refill(s).          *Daily Multiple Vitamins oral tablet: 1 tab(s), po, daily.       Problem list:    All Problems (Selected)  HLD (hyperlipidemia) / SNOMED CT 24813867 / Confirmed  Seasonal allergies / SNOMED CT 433202777 / Confirmed  Family history of colon cancer / SNOMED CT 513539661 / Confirmed  Squamous cell carcinoma in situ / SNOMED CT 183589252 / Confirmed  Adenomatous colon polyp / SNOMED CT 9700335990 / Confirmed  Asthma, mild intermittent / SNOMED CT 3184231858 / Confirmed  Hearing loss / SNOMED CT 76339734 / Confirmed  Obesity / SNOMED CT 8608514158 / Probable  DJD (degenerative joint disease) / SNOMED CT 5830949475 / Confirmed  Skin lesions / SNOMED CT 157477892 / Confirmed      Histories   Past Medical History:    Active  Squamous cell carcinoma in situ (616169874): Onset on 1/6/2017 at 61 years.  Comments:  1/11/2017 CST 12:54 PM Elisa Serrano  Skin, vale of antihelix, right, superior.  Skin lesions (452701363): Onset on 1/5/2017 at 61 years.  Comments:  1/11/2017 CST 12:52 PM CST - Lincoln , Elisa  Lentigines, benign nevi, seborrheic keratoses, cherry angiomas.  Asthma, mild intermittent (4224494708): Onset on 1/21/2011 at 55 years.  Comments:  1/21/2011 CST 3:34 PM  CST -     HLD (hyperlipidemia) (73216650)  Family history of colon cancer (926523253)  DJD (degenerative joint disease) (4984723160)  Obesity (4481127496)  Seasonal allergies (843343883)  Hearing loss (45676356)  Resolved  Anxiety (8216634081):  Resolved.  CT Cardiac Calcium Score:  Resolved.  Comments:  2013 CDT 6:03 PM CDT - Juarez Cayla ERAZO  Date of test: 2013   Score: 1   Family History:    Cancer  Father (Chava, )  Comments:  2011 3:32 PM CST - Allen TATUM, Emmanuelle  Bone  CA - Cancer of colon  Grandfather (P)  Comments:  2011 11:02 AM CST - Elisa Aguilera  In his 60s.  Cancer of colon  Mother ()  Cancer of prostate  Father (Chava, )     Procedure history:    Cardiac computed tomography for calcium scoring (SNOMED CT 8608719241) on 3/16/2020 at 65 Years.  Comments:  3/16/2020 2:37 PM CDT - Marisa Tillman  Total Agatston calcium score is 20.  Colonoscopy (SNOMED CT 496013506) performed by Neville Jones MD on 3/5/2020 at 64 Years.  Comments:  3/17/2020 2:13 PM CDT - Saw INGRAM, Natali  Results:  tubular adenoma x3, negative for dysplasia.  Recommendation:  f/u 5yrs    3/9/2020 1:15 PM CDT - Guerda Serna  Indication: History of colonic polyps.  Sedation: MAC.  Impression: One 2 mm polyp in cecum.  One 3 mm polyp in transverse colon.  One 3 mm polyp in descending colon.  Diverticulosis in sigmoid colon.  Perianal skin tags.  Shave biopsy of skin lesion (SNOMED CT 561832058) on 2017 at 61 Years.  Comments:  2017 12:56 PM Elisa Serrano  Skin, vale of antihelix, right, superior revealed squamous cell ca in situ.    2017 12:49 PM CST Elisa Gamez  Left distal calf, right knee, right superior vale of antihelix, right lateral temple.  Colonoscopy (SNOMED CT 905521088) performed by Hernandez Alford MD on 3/25/2015 at 60 Years.  Comments:  3/26/2015 3:41 PM SERGE - Hernandez Alford MD  Indication: First degree relative with colon cancer  <60  Sedation: 3 mg Versed, 150 mcg Fentanyl  Findings: Single divertiulum, Small tubular adenoma 50 cm  Repeat in 5 years  Colonoscopy (SNOMED CT 737476162) in the month of 12/2009 at 54 Years.  Appendectomy (SNOMED CT 842925103).  Right Knee Arthroscopy (open).   Social History:        Electronic Cigarette/Vaping Assessment            Electronic Cigarette Use: Never.      Alcohol Assessment            Current, 4-5 times per week, 2 drinks/episode average.      Tobacco Assessment            Former smoker, quit more than 30 days ago      Substance Abuse Assessment            Past, Marijuana      Employment and Education Assessment            Employed, Work/School description: .      Home and Environment Assessment            Marital status: .  Spouse/Partner name: Elisa.  Risks in environment: owns secured gun.      Nutrition and Health Assessment            Type of diet: Regular.      Exercise and Physical Activity Assessment            Exercise frequency: Never.        Physical Examination   Vital Signs   6/17/2021 7:51 AM CDT Temperature Oral 97.9 DegF    Peripheral Pulse Rate 71 bpm    HR Method Electronic    Systolic Blood Pressure 133 mmHg  HI    Diastolic Blood Pressure 83 mmHg  HI    Mean Arterial Pressure 100 mmHg    BP Method Electronic      Measurements from flowsheet : Measurements   6/17/2021 7:51 AM CDT Height Measured - Standard 67.5 in    Weight Measured - Standard 197.3 lb    BSA 2.06 m2    Body Mass Index 30.44 kg/m2  HI      General:  Alert and oriented.    Eye:  Pupils are equal, round and reactive to light, Normal conjunctiva.    HENT:  Normocephalic, Tympanic membranes are clear, Oral mucosa is moist.    Neck:  Supple, Non-tender, No lymphadenopathy, No thyromegaly.    Respiratory:  Breath sounds are equal, Symmetrical chest wall expansion.         Respirations: Are within normal limits.         Pattern: Regular.         Breath sounds: Bilateral, Within normal limits.     Cardiovascular:  Normal rate, Regular rhythm, No murmur, Good pulses equal in all extremities, Normal peripheral perfusion, No edema.    Gastrointestinal:  Soft, Non-tender, Non-distended, Normal bowel sounds.    Musculoskeletal:  degenerative changes noted.    Integumentary:  Warm, Dry.    Neurologic:  No focal deficits.    Cognition and Speech:  Oriented, Speech clear and coherent.    Psychiatric:  Appropriate mood & affect, Normal judgment.       Health Maintenance      Recommendations     Pending (in the next year)        OverDue           Depression Screen due  01/09/20  and every 1  year(s)           Lipid Disorders Screen due  02/05/21  and every 1  year(s)        Due            Abdominal Aortic Aneurysm Screen (if hx of smoking) due  06/17/21  One-time only           Fall Risk Screen due  06/17/21  and every 1  year(s)           Hepatitis C Screen 7413-3954 due  06/17/21  One-time only           Lung Cancer Screen due  06/17/21  and every 1  year(s)        Due In Future            Influenza Vaccine not due until  09/01/21  and every 1  year(s)     Satisfied (in the past 1 year)        Satisfied            Body Mass Index Check on  06/17/21.           Body Mass Index Check on  04/19/21.           Body Mass Index Check on  08/17/20.           COVID-19 Vaccine (Pfizer) Dose 2 on  03/24/21.           COVID-19 Vaccine (Pfizer) Dose 1 on  03/03/21.           High Blood Pressure Screen on  06/17/21.           High Blood Pressure Screen on  04/19/21.           High Blood Pressure Screen on  08/17/20.           Influenza Vaccine on  10/28/20.           Obesity Screen and Counseling on  06/17/21.           Obesity Screen and Counseling on  04/19/21.           Obesity Screen and Counseling on  08/17/20.           Tobacco Use Screen (Male) on  06/17/21.           Tobacco Use Screen (Male) on  04/19/21.           Tobacco Use Screen (Male) on  08/17/20.          Impression and Plan   Diagnosis     Annual exam (XLR67-EU  Z00.00).     HLD (Hyperlipidemia) (ABD04-UI E78.5).     Family History of Prostate Cancer (NVG32-QR Z80.42).     DJD (Degenerative Joint Disease) (QUZ39-GK Q89.8).     Asthma, Mild Intermittent (DVH70-TE J45.20).     Family History of Colon Cancer (SJS66-VB Z80.0).     Course:  Progressing as expected.    Plan:  Problem or 1 hyperlipidemia controlled on atorvastatin  Due to seasonal allergies controlled with Zyrtec  3.  Family history prostate cancer will get yearly PSAs  4.  Family history of colon cancer and history of colon polyps due for colonoscopy in 4 years  5.  Remote history of asthma no problems for at least 5 years now  6.  Presbycusis has hearing aids  .    Patient Instructions:       Counseled: Patient, Regarding diagnosis, Regarding treatment, Regarding medications, Diet, Activity, Verbalized understanding.    Counseled:  Patient, Routine exercise and healthy weight maintenance discussed.    Patient Instructions:  Return to clinic in one year for next routine health visit, or sooner if problems or concerns.

## 2022-02-16 NOTE — LETTER
(Inserted Image. Unable to display)     February 08, 2021      YENI ANGULO  [id number] [place], [place] [id number]          Dear YENI,      Thank you for selecting [facility] (previously [facility]) for your healthcare needs.    Our records indicate you are due for the following services:    Annual Physical.    Fasting Lab Tests ~ Please do not eat or drink anything 10 hours prior to your scheduled appointment time.  (Water and any medications that you may need are allowed unless directed otherwise.)    If you had your labs done at another facility or with Direct Access Lab Testing at [facility], please bring in a copy of the results to your next visit, mail a copy, or drop off a copy of your results to your Healthcare Provider.    (FYI   Regarding office visits: In some instances, a video visit or telephone visit may be offered as an option.)    You are due for lab work and an office visit; please schedule the lab appointment 1 week before the office visit.  This will assure all results are available to discuss with your Healthcare Provider during your visit.    **It is very helpful if you bring your medication bottles to your appointment.  This assures we have all of your current medications, including strength and dosing information, documented accurately in your medical record.    To schedule an appointment or if you have further questions, please contact your clinic at [phone].      Powered by [email or web address]    Sincerely,    Will Morocho MD

## 2022-02-16 NOTE — TELEPHONE ENCOUNTER
---------------------From: Clarisa Luke To:  <[email or web address]>;   Sent: 1/17/2019 5:47:17 AM CSTSubject: General Message Patient: YENI ANGULO; YOB: 1955 The attached Referral Note is for an appointment scheduled with Dr. Rhodes at [place] on 1-24-19 at 1:30.

## 2022-02-16 NOTE — LETTER
(Inserted Image. Unable to display)     January 10, 2020      YENI ANGULO  [id number] [place], [place] [id number]          Dear YENI,      Thank you for selecting [facility] ([facility] for your healthcare needs.    Our records indicate you are due for the following services:    Annual Physical.    Fasting Lab Tests ~ Please do not eat or drink anything 10 hours prior to your scheduled appointment time.  (Water and any medications that you may need are allowed unless directed otherwise.)    If you had your labs done at another facility or with Direct Access Lab Testing at [facility], please bring in a copy of the results to your next visit, mail a copy, or drop off a copy of your results to your Healthcare Provider.    You are due for lab work and an office visit; please schedule the lab appointment 1 week before the office visit.  This will assure all results are available to discuss with your provider during your visit.    **It is very helpful if you bring your medication bottles to your appointment.  This assures we have all of your current medications, including strength and dosing information, documented accurately in your medical record.    To schedule an appointment or if you have further questions, please contact your primary clinic:   [facility]       [phone]   [facility]       [phone]              [facility]     [phone]      Powered by [employer]    Sincerely,    Will Morocho MD

## 2022-02-16 NOTE — PROGRESS NOTES
Patient:   YENI ANGULO            MRN: [id number]            FIN: [id number]               Age:   65 years     Sex:  Male     :  1955   Associated Diagnoses:   None   Author:   Natali Spring MA       -   Today's date:  3/17/2020 .        -           I have received and  reviewed  your CT coronary calciuim score.     Review / Management   Results review:  The total Agatston calcium score is 20.  The range of the Agatston score is 0---2000 and also based on a persons age.  A calcium score in this range places the individual in the approximate 20-25th percentile when compared to an age and gender matched control group and implies a moderate risk for cardiac events in the next 10 year.   Mild atherosclerotic plaque visualized in the ascending aorta.  We switched from simvastatin to Lipitor 40mg daily..    Please contact me if you have any questions or concerns.      -   Sincerely,   Dr. Morocho    [facility]    [phone]

## 2022-02-16 NOTE — TELEPHONE ENCOUNTER
---------------------  From: Betsy Domingo CMA (Phone Messages Pool ([id number]_[facility]))   To: Referral Coordinators Pool ([id number]_[facility]);     Sent: 2/12/2020 3:27:00 PM CST  Subject: Cardiac CT      Pt LM at 1447    TFS ordered a cardiac ct calcium score for pt on 2/5 and pt has not heard anything yet.     Can call him at [phone]

## 2022-02-16 NOTE — NURSING NOTE
Comprehensive Intake Entered On:  8/27/2019 6:50 PM CDT    Performed On:  8/27/2019 6:46 PM CDT by Betsy Domingo CMA               Summary   Chief Complaint :   Pt c/o L ear pain x 2 weeks.    Weight Measured :   195 lb(Converted to: 195 lb 0 oz, 88.45 kg)    Systolic Blood Pressure :   122 mmHg   Diastolic Blood Pressure :   74 mmHg   Mean Arterial Pressure :   90 mmHg   Peripheral Pulse Rate :   68 bpm   BP Site :   Right arm   Pulse Site :   Radial artery   BP Method :   Manual   HR Method :   Manual   Temperature Tympanic :   98.1 DegF(Converted to: 36.7 DegC)    Betsy Domingo CMA - 8/27/2019 6:46 PM CDT   Health Status   Allergies Verified? :   Yes   Medication History Verified? :   Yes   Pre-Visit Planning Status :   Not completed   Betsy Domingo CMA - 8/27/2019 6:46 PM CDT   Meds / Allergies   (As Of: 8/27/2019 6:50:15 PM CDT)   Allergies (Active)   No known allergies  Estimated Onset Date:   Unspecified ; Created By:   Loyda Medel CMA; Reaction Status:   Active ; Category:   Drug ; Substance:   No known allergies ; Type:   Allergy ; Updated By:   Loyda Medel CMA; Source:   Patient ; Reviewed Date:   6/29/2018 9:02 AM CDT        Medication List   (As Of: 8/27/2019 6:50:15 PM CDT)   Prescription/Discharge Order    cetirizine  :   cetirizine ; Status:   Prescribed ; Ordered As Mnemonic:   ZyrTEC 10 mg oral tablet ; Simple Display Line:   10 mg, 1 tab(s), po, daily, 90 tab(s), 3 Refill(s) ; Ordering Provider:   Will Morocho MD; Catalog Code:   cetirizine ; Order Dt/Tm:   1/9/2019 5:10:49 PM          simvastatin  :   simvastatin ; Status:   Prescribed ; Ordered As Mnemonic:   simvastatin 40 mg oral tablet ; Simple Display Line:   40 mg, 1 tab(s), po, hs, 90 tab(s), 3 Refill(s) ; Ordering Provider:   Will Morocho MD; Catalog Code:   simvastatin ; Order Dt/Tm:   1/9/2019 5:10:48 PM            Home Meds    aspirin  :   aspirin ; Status:   Documented ; Ordered As Mnemonic:   aspirin 81 mg  oral tablet ; Simple Display Line:   81 mg, 1 tab(s), po, daily, tab(s) ; Catalog Code:   aspirin ; Order Dt/Tm:   1/21/2011 2:25:02 PM          multivitamin  :   multivitamin ; Status:   Documented ; Ordered As Mnemonic:   Daily Multiple Vitamins oral tablet ; Simple Display Line:   1 tab(s), po, daily ; Catalog Code:   multivitamin ; Order Dt/Tm:   1/21/2011 2:24:44 PM

## 2022-02-16 NOTE — LETTER
(Inserted Image. Unable to display)   February 06, 2020      YENI ANGULO      [id number] [place], [place] [phone]        Dear YENI,    Thank you for selecting [facility] for your healthcare needs.  Below you will find the results of the recent tests done at our clinic.     All labs acceptable.      Result Name Current Result Previous Result Reference Range   Glucose Level (mg/dL)  97 2/5/2020 ((H)) 102 6/29/2018 65 - 99   Cholesterol (mg/dL)  158 2/5/2020  152 6/29/2018  - <200   HDL (mg/dL)  42 2/5/2020  42 6/29/2018 > OR = 40 -    LDL  96 2/5/2020  89 6/29/2018    Triglyceride (mg/dL)  106 2/5/2020  113 6/29/2018  - <150   PSA (ng/mL)  1.2 2/5/2020  1.0 6/29/2018  - < OR = 4.0       Please contact me or my assistant at [phone] if you have any questions.     Sincerely,        Will Morocho MD        What do your labs mean?  Below is a glossary of commonly ordered labs:  LDL   Bad Cholesterol   HDL   Good Cholesterol  AST/ALT   Liver Function   Cr/Creatinine   Kidney Function  Microalbumin   Kidney Function  BUN   Kidney Function  PSA   Prostate    TSH   Thyroid Hormone  HgbA1c   Diabetes Test   Hgb (Hemoglobin)   Red Blood Cells

## 2022-02-16 NOTE — NURSING NOTE
CAGE Assessment Entered On:  1/10/2019 8:36 AM CST    Performed On:  1/9/2019 8:36 AM CST by Akua Pelayo               Assessment   Have you ever felt you should cut down on your drinking :   No   Have people annoyed you by criticizing your drinking :   No   Have you ever felt bad or guilty about your drinking :   No   Have you ever taken a drink first thing in the morning to steady your nerves or get rid of a hangover (Eye-opener) :   No   CAGE Score :   0    Akua Pelayo - 1/10/2019 8:36 AM CST

## 2022-02-16 NOTE — NURSING NOTE
Asthma Control Test (ACT) Total Entered On:  4/1/2020 12:20 PM CDT    Performed On:  4/1/2020 12:20 PM CDT by Betsy Domingo CMA               Asthma Control Test (ACT) Total   Asthma Control Test Total (Adult) :   25    Betsy Domingo CMA - 4/1/2020 12:20 PM CDT

## 2022-02-16 NOTE — NURSING NOTE
Comprehensive Intake Entered On:  8/17/2020 4:14 PM CDT    Performed On:  8/17/2020 4:10 PM CDT by Elo Arias               Summary   Chief Complaint :   Lymes testing.   Weight Measured :   197 lb(Converted to: 197 lb 0 oz, 89.36 kg)    Height Measured :   67.5 in(Converted to: 5 ft 7 in, 171.45 cm)    Body Mass Index :   30.4 kg/m2 (HI)    Body Surface Area :   2.06 m2   Systolic Blood Pressure :   112 mmHg   Diastolic Blood Pressure :   68 mmHg   Mean Arterial Pressure :   83 mmHg   Peripheral Pulse Rate :   82 bpm   Temperature Tympanic :   98.6 DegF(Converted to: 37.0 DegC)    Elo Arias - 8/17/2020 4:10 PM CDT   Health Status   Allergies Verified? :   Yes   Medication History Verified? :   Yes   Medical History Verified? :   Yes   Pre-Visit Planning Status :   Completed   Tobacco Use? :   Never smoker   Elo Arias - 8/17/2020 4:10 PM CDT   Demographics   Last Name :   [facility]   Address :   [address]   First Name :   Hubert   City :   [place]   [place] :   [place]   Zip Code :   [id number]   Elo Arias - 8/17/2020 4:10 PM CDT   Providers Grid   Provider Name :    Dr. Christianson Dr Schwabach           Provider Specialty :    Opthamologist   Dentist             Elo Arias - 8/17/2020 4:10 PM CDT  Elo Arias - 8/17/2020 4:10 PM CDT        Meds / Allergies   (As Of: 8/17/2020 4:14:30 PM CDT)   Allergies (Active)   No known allergies  Estimated Onset Date:   Unspecified ; Created By:   Loyda Medel CMA; Reaction Status:   Active ; Category:   Drug ; Substance:   No known allergies ; Type:   Allergy ; Updated By:   Loyda Medel CMA; Source:   Patient ; Reviewed Date:   8/17/2020 4:12 PM CDT        Medication List   (As Of: 8/17/2020 4:14:30 PM CDT)   Prescription/Discharge Order    cetirizine  :   cetirizine ; Status:   Prescribed ; Ordered As Mnemonic:   ZyrTEC 10 mg oral tablet ; Simple Display Line:   10 mg, 1 tab(s), po, daily, 90  tab(s), 3 Refill(s) ; Ordering Provider:   Will Morocho MD; Catalog Code:   cetirizine ; Order Dt/Tm:   2/5/2020 8:23:14 AM CST          atorvastatin  :   atorvastatin ; Status:   Prescribed ; Ordered As Mnemonic:   Lipitor 40 mg oral tablet ; Simple Display Line:   40 mg, 1 tab(s), Oral, daily, 90 tab(s), 3 Refill(s) ; Ordering Provider:   Will Morocho MD; Catalog Code:   atorvastatin ; Order Dt/Tm:   3/17/2020 1:41:22 PM CDT            Home Meds    multivitamin  :   multivitamin ; Status:   Documented ; Ordered As Mnemonic:   Daily Multiple Vitamins oral tablet ; Simple Display Line:   1 tab(s), po, daily ; Catalog Code:   multivitamin ; Order Dt/Tm:   1/21/2011 2:24:44 PM CST          aspirin  :   aspirin ; Status:   Documented ; Ordered As Mnemonic:   aspirin 81 mg oral tablet ; Simple Display Line:   81 mg, 1 tab(s), po, daily, tab(s) ; Catalog Code:   aspirin ; Order Dt/Tm:   1/21/2011 2:25:02 PM CST            ID Risk Screen   Recent Travel History :   No recent travel   Family Member Travel History :   No recent travel   Other Exposure to Infectious Disease :   Unknown   Elo Arias - 8/17/2020 4:10 PM CDT

## 2022-02-16 NOTE — TELEPHONE ENCOUNTER
---------------------  From: Connor Corey MA   Sent: 3/19/2019 1:53:28 PM CDT  Subject: Shingrix vaccine      I called pt, I informed him we have the Shingrix shot available, he is interested in coming in for a shot appt to recieve the first dose, he was trans to scheduling to set up an appt.     Time of call: 1:45pm    connor jensen cma

## 2022-02-16 NOTE — PROGRESS NOTES
Patient:   YENI ANGULO            MRN: [id number]            FIN: [id number]               Age:   64 years     Sex:  Male     :  1955   Associated Diagnoses:   Annual exam; HLD (Hyperlipidemia); Family History of Prostate Cancer; DJD (Degenerative Joint Disease); Asthma, Mild Intermittent; Family History of Colon Cancer   Author:   Will Morocho MD      Visit Information      Date of Service: 2020 07:49 am  Performing Location: [facility]  Encounter#: [id number]      Primary Care Provider (PCP):  Will Morocho MD    NPI# [id number]      Referring Provider:  Will Morocho MD# [id number]      Chief Complaint   2020 7:53 AM CST     Px     Routine Health Care Visit      History of Present Illness   Doing well   Meds as directed for lipids  Needs colonoscopy  No change in Family history             The patient presents for a general exam.  The patient's general health status is described as good.  The patient's diet is described as balanced.  Exercise:  none.  Associated symptoms consist of none.  Additional pertinent history: occasional caffeine use, tobacco use none and alcohol use socially.  no inhaler use 4 years.        Review of Systems   Constitutional:  Negative.    Eye:  Negative.    Ear/Nose/Mouth/Throat:  Negative.    Respiratory:  Negative.    Cardiovascular:  Negative.    Gastrointestinal:  Negative.    Genitourinary:  Negative.    Hematology/Lymphatics:  Negative.    Endocrine:  Negative.    Immunologic:  Negative.    Musculoskeletal:  Negative.    Integumentary:  Negative.    Neurologic:  Negative.    Psychiatric:  Negative.    All other systems reviewed and negative      Health Status   Allergies:    Allergic Reactions (Selected)  No known allergies   Medications:  (Selected)   Prescriptions  Prescribed  ZyrTEC 10 mg oral tablet: = 1 tab(s) ( 10 mg ), po, daily, # 90 tab(s), 3 Refill(s), Type: Maintenance, Pharmacy: [facility]  QUIRINO, 1 tab(s) Oral daily  simvastatin 40 mg oral tablet: = 1 tab(s) ( 40 mg ), po, hs, # 90 tab(s), 3 Refill(s), Type: Maintenance, Pharmacy: [facility], 1 tab(s) Oral hs  Documented Medications  Documented  Daily Multiple Vitamins oral tablet: 1 tab(s), po, daily, 0 Refill(s), Type: Maintenance  aspirin 81 mg oral tablet: 1 tab(s) ( 81 mg ), po, daily, tab(s), 0 Refill(s), Type: Maintenance,    Medications          *denotes recorded medication          *aspirin 81 mg oral tablet: 81 mg, 1 tab(s), po, daily, tab(s).          ZyrTEC 10 mg oral tablet: 10 mg, 1 tab(s), po, daily, 90 tab(s), 3 Refill(s).          *Daily Multiple Vitamins oral tablet: 1 tab(s), po, daily.          simvastatin 40 mg oral tablet: 40 mg, 1 tab(s), po, hs, 90 tab(s), 3 Refill(s).       Problem list:    All Problems  Adenomatous colon polyp / SNOMED CT 0115848483 / Confirmed  Family history of colon cancer / SNOMED CT 505395657 / Confirmed  Hearing loss / SNOMED CT 05211690 / Confirmed  HLD (hyperlipidemia) / SNOMED CT 28012578 / Confirmed  Asthma, mild intermittent / SNOMED CT 7010503710 / Confirmed  Obesity / SNOMED CT 1136960800 / Probable  DJD (degenerative joint disease) / SNOMED CT 3366034125 / Confirmed  Seasonal allergies / SNOMED CT 047949212 / Confirmed  Skin lesions / SNOMED CT 269880515 / Confirmed  Squamous cell carcinoma in situ / SNOMED CT 177019104 / Confirmed  Resolved: Anxiety / SNOMED CT 7494147722  Resolved: CT Cardiac Calcium Score      Histories   Past Medical History:    Active  Squamous cell carcinoma in situ (820619850): Onset on 1/6/2017 at 61 years.  Comments:  1/11/2017 CST 12:54 PM Elisa Serrano  Skin, vale of antihelix, right, superior.  Skin lesions (165872013): Onset on 1/5/2017 at 61 years.  Comments:  1/11/2017 CST 12:52 PM CST - [place] , Elisa  Lentigines, benign nevi, seborrheic keratoses, cherry angiomas.  Asthma, mild intermittent (4508444590): Onset on 1/21/2011 at 55  years.  Comments:  2011 CST 3:34 PM CST -     HLD (hyperlipidemia) (72778102)  Family history of colon cancer (857159188)  DJD (degenerative joint disease) (0370387249)  Obesity (6962571183)  Seasonal allergies (971419455)  Hearing loss (50698874)  Resolved  Anxiety (8400519529):  Resolved.  CT Cardiac Calcium Score:  Resolved.  Comments:  2013 CDT 6:03 PM CDT - [place] Cayla ERAZO  Date of test: 2013   Score: 1   Family History:    Cancer  Father (Chava, )  Comments:  2011 3:32 PM CST - Allen TATUM, Emmanuelle  Bone  CA - Cancer of colon  Grandfather (P)  Comments:  2011 11:02 AM CST - Elisa Aguilera  In his 60s.  Cancer of colon  Mother ()  Cancer of prostate  Father (Chava, )     Procedure history:    Shave biopsy of skin lesion (SNOMED CT 828882783) on 2017 at 61 Years.  Comments:  2017 12:56 PM CST Elisa Gamez  Skin, vale of antihelix, right, superior revealed squamous cell ca in situ.    2017 12:49 PM CST - Elisa Aguilera  Left distal calf, right knee, right superior vale of antihelix, right lateral temple.  Colonoscopy (SNOMED CT 942981082) performed by Hernandez Alford MD on 3/25/2015 at 60 Years.  Comments:  3/26/2015 3:41 PM CDT - Hernandez Alford MD  Indication: First degree relative with colon cancer <60  Sedation: 3 mg Versed, 150 mcg Fentanyl  Findings: Single divertiulum, Small tubular adenoma 50 cm  Repeat in 5 years  Colonoscopy (SNOMED CT 888526192) in the month of 2009 at 54 Years.  Appendectomy (SNOMED CT 423515344).  Right Knee Arthroscopy (open).   Social History:        Alcohol Assessment            Current, 4-5 times per week, 2 drinks/episode average.      Tobacco Assessment            Former smoker      Substance Abuse Assessment            Past, Marijuana      Employment and Education Assessment            Employed, Work/School description: .      Home and Environment Assessment            Marital status:  .  Spouse/Partner name: Elisa.  Risks in environment: owns secured gun.      Nutrition and Health Assessment            Type of diet: Regular.      Exercise and Physical Activity Assessment            Exercise frequency: Never.        Physical Examination   Vital Signs   2/5/2020 7:53 AM CST Temperature Tympanic 97.6 DegF  LOW    Peripheral Pulse Rate 73 bpm    HR Method Electronic    Systolic Blood Pressure 116 mmHg    Diastolic Blood Pressure 73 mmHg    Mean Arterial Pressure 87 mmHg    BP Method Electronic      Measurements from flowsheet : Measurements   2/5/2020 7:53 AM CST Height Measured - Standard 67.5 in    Weight Measured - Standard 193.4 lb    BSA 2.04 m2    Body Mass Index 29.84 kg/m2  HI      General:  Alert and oriented.    Eye:  Pupils are equal, round and reactive to light, Normal conjunctiva.    HENT:  Normocephalic, Tympanic membranes are clear, Oral mucosa is moist.    Neck:  Supple, Non-tender, No lymphadenopathy, No thyromegaly.    Respiratory:  Breath sounds are equal, Symmetrical chest wall expansion.         Respirations: Are within normal limits.         Pattern: Regular.         Breath sounds: Bilateral, Within normal limits.    Cardiovascular:  Normal rate, Regular rhythm, No murmur, Good pulses equal in all extremities, Normal peripheral perfusion, No edema.    Breast:  No mass.         Lymph nodes: Within normal limits.    Gastrointestinal:  Soft, Non-tender, Non-distended, Normal bowel sounds.    Musculoskeletal:  degenerative changes noted.    Integumentary:  Warm, Dry.    Neurologic:  No focal deficits.    Cognition and Speech:  Oriented, Speech clear and coherent.    Psychiatric:  Appropriate mood & affect, Normal judgment.       Health Maintenance      Recommendations     Pending (in the next year)        OverDue           Lipid Disorders Screen (Male) due  06/29/19  and every 1  year(s)        Due            Depression Screen (Male) due  01/09/20  and every 1  year(s)            Abdominal Aortic Aneurysm Screen (if hx of smoking) due  02/05/20  One-time only           Fall Risk Screen (Male) due  02/05/20  and every 1  year(s)           Hepatitis C Screen 8909-1319 (Male) due  02/05/20  One-time only           Lung Cancer Screen (Male) due  02/05/20  and every 1  year(s)           Type 2 Diabetes Mellitus Screen (Male) due  02/05/20  Variable frequency        Due In Future            Influenza Vaccine not due until  08/31/20  and every 1  year(s)     Satisfied (in the past 1 year)        Satisfied            Body Mass Index Check (Male) on  02/05/20.           High Blood Pressure Screen (Male) on  02/05/20.           High Blood Pressure Screen (Male) on  08/27/19.           Influenza Vaccine on  12/20/19.           Obesity Screen and Counseling (Male) on  02/05/20.           Obesity Screen and Counseling (Male) on  08/27/19.           Tobacco Use Screen (Male) on  02/05/20.           Zoster/Shingles Vaccine on  08/27/19.           Zoster/Shingles Vaccine on  03/20/19.          Impression and Plan   Diagnosis     Annual exam (LCD64-JN Z00.00).     HLD (Hyperlipidemia) (EDE26-DD E78.5).     Family History of Prostate Cancer (YDS73-RE Z80.42).     DJD (Degenerative Joint Disease) (WDZ41-GJ Q89.8).     Asthma, Mild Intermittent (DOY97-IP J45.20).     Family History of Colon Cancer (LSC88-DJ Z80.0).     Course:  Progressing as expected.    Plan:  ok for colonoscopy  want rpt cardiac scan  doing great  BMI,Weight loss,exercise,diet discussed  cage/phq9 reviewed with patient  .    Patient Instructions:       Counseled: Patient, Regarding diagnosis, Regarding treatment, Regarding medications, Diet, Activity, Verbalized understanding.    Counseled:  Patient, Routine exercise and healthy weight maintenance discussed.    Patient Instructions:  Return to clinic in one year for next routine health visit, or sooner if problems or concerns.

## 2022-02-16 NOTE — NURSING NOTE
Comprehensive Intake Entered On:  1/9/2019 4:43 PM CST    Performed On:  1/9/2019 4:41 PM CST by Cayla Crawley               Summary   Chief Complaint :   Px   Weight Measured :   190.8 lb(Converted to: 190 lb 13 oz, 86.55 kg)    Height Measured :   67.5 in(Converted to: 5 ft 7 in, 171.45 cm)    Body Mass Index :   29.44 kg/m2 (HI)    Body Surface Area :   2.03 m2   Systolic Blood Pressure :   137 mmHg (HI)    Diastolic Blood Pressure :   81 mmHg (HI)    Mean Arterial Pressure :   100 mmHg   Peripheral Pulse Rate :   66 bpm   BP Method :   Electronic   HR Method :   Electronic   Temperature Tympanic :   97.3 DegF(Converted to: 36.3 DegC)  (LOW)    Cayla Crawley - 1/9/2019 4:41 PM CST   Health Status   Allergies Verified? :   Yes   Medication History Verified? :   Yes   Pre-Visit Planning Status :   Completed   Tobacco Use? :   Former smoker   Cayla Crawley - 1/9/2019 4:41 PM CST   Meds / Allergies   (As Of: 1/9/2019 4:43:24 PM CST)   Allergies (Active)   No known allergies  Estimated Onset Date:   Unspecified ; Created By:   Loyda Medel CMA; Reaction Status:   Active ; Category:   Drug ; Substance:   No known allergies ; Type:   Allergy ; Updated By:   Loyda Medel CMA; Source:   Patient ; Reviewed Date:   6/29/2018 9:02 AM CDT        Medication List   (As Of: 1/9/2019 4:43:24 PM CST)   Prescription/Discharge Order    cetirizine  :   cetirizine ; Status:   Prescribed ; Ordered As Mnemonic:   ZyrTEC 10 mg oral tablet ; Simple Display Line:   10 mg, 1 tab(s), po, daily, 90 tab(s), 3 Refill(s) ; Ordering Provider:   Wlil Morocho MD; Catalog Code:   cetirizine ; Order Dt/Tm:   6/29/2018 10:11:28 AM          simvastatin  :   simvastatin ; Status:   Prescribed ; Ordered As Mnemonic:   simvastatin 40 mg oral tablet ; Simple Display Line:   40 mg, 1 tab(s), po, hs, 90 tab(s), 3 Refill(s) ; Ordering Provider:   Will Morocho MD; Catalog Code:   simvastatin ; Order Dt/Tm:   6/29/2018 10:11:26  AM            Home Meds    aspirin  :   aspirin ; Status:   Documented ; Ordered As Mnemonic:   aspirin 81 mg oral tablet ; Simple Display Line:   81 mg, 1 tab(s), po, daily, tab(s) ; Catalog Code:   aspirin ; Order Dt/Tm:   1/21/2011 2:25:02 PM          multivitamin  :   multivitamin ; Status:   Documented ; Ordered As Mnemonic:   Daily Multiple Vitamins oral tablet ; Simple Display Line:   1 tab(s), po, daily ; Catalog Code:   multivitamin ; Order Dt/Tm:   1/21/2011 2:24:44 PM

## 2022-02-16 NOTE — TELEPHONE ENCOUNTER
Patient would like to have MRI Let shoulder done at [facility]. Referral brought over to them 01/10/2019.   They will contact patient to schedule appointment.

## 2022-02-16 NOTE — PROGRESS NOTES
Chief Complaint    Lymes testing.  History of Present Illness      Chief complaint as above reviewed and confirmed with patient.  Pt presents to the clinic with concerns re: tick bite 12 days ago, pt believes it was a deer tick, in place 12-24 hours but not certain.  Had a round rash at the site that was size of jeni at site of bite for 3 days and then resolved. Pt is concerned about lyme disease requesting serology.  He feels well otherwise.  No fevers, muscle aches, fatigue, joint swelling or rash.   Review of Systems      Review of systems is negative with the exception of those noted in HPI   Physical Exam   Vitals & Measurements    T: 98.6  F (Tympanic)  HR: 82 (Peripheral)  BP: 112/68     HT: 67.5 in  WT: 197 lb  BMI: 30.4       nad appears well      pt has no current rash.   Assessment/Plan       Rash (R21)         currently resolved, difficult to know if it was related to lyme disease or due to local reaction.  More likely local reaction as it was small, lasted only 3 days.  Will check serology.  Pt has no other sx, discussed if it is negative would recommend pt retesting if he does develop in the next several weeks.          Ordered:          Lyme disease antibody, total, eia with reflex to western blot (igg,igm)* ([facility]), Specimen Type: Serum, Collection Date: 08/17/20 16:27:00 CDT                Tick bite (W57.XXXA)         Ordered:          Lyme disease antibody, total, eia with reflex to western blot (igg,igm)* ([facility]), Specimen Type: Serum, Collection Date: 08/17/20 16:27:00 CDT           Patient Information     Name:YENI ANGULO      Address:      [address] [id number]     Sex:Male     YOB: 1955     [phone]     Emergency Contact:ANTIONE ANGULO     MRN:[id number]     FIN:[id number]     Location:[facility]     Date of Service:08/17/2020      Primary Care Physician:       Will Morocho MD, [phone]      Attending  Physician:       Aliza VAZQUEZ, Estephania SHABAZZ, [phone]  Problem List/Past Medical History    Ongoing     Adenomatous colon polyp     Asthma, mild intermittent     DJD (degenerative joint disease)     Family history of colon cancer     Hearing loss     HLD (hyperlipidemia)     Obesity     Seasonal allergies     Skin lesions       Comments: Lentigines, benign nevi, seborrheic keratoses, cherry angiomas.     Squamous cell carcinoma in situ       Comments: Skin, vale of antihelix, right, superior.    Historical     Anxiety     CT Cardiac Calcium Score       Comments: Date of test: 8/5/2013 Score: 1  Procedure/Surgical History     Cardiac computed tomography for calcium scoring (03/16/2020)      Comments: Total Agatston calcium score is 20..     Colonoscopy (03/05/2020)      Comments: Results:  tubular adenoma x3, negative for dysplasia.  Recommendation:  f/u 5yrs. Indication: History of colonic polyps.      Sedation: MAC.      Impression: One 2 mm polyp in cecum.  One 3 mm polyp in transverse colon.  One 3 mm polyp in descending colon.  Diverticulosis in sigmoid colon.  Perianal skin tags..     Shave biopsy of skin lesion (01/05/2017)      Comments: Left distal calf, right knee, right superior vale of antihelix, right lateral temple.. Skin, vale of antihelix, right, superior revealed squamous cell ca in situ..     Colonoscopy (03/25/2015)      Comments: Indication: First degree relative with colon cancer <60      Sedation: 3 mg Versed, 150 mcg Fentanyl      Findings: Single divertiulum, Small tubular adenoma 50 cm      Repeat in 5 years.     Colonoscopy (12/2009)     Appendectomy     Right Knee Arthroscopy (open)  Medications    aspirin 81 mg oral tablet, 81 mg= 1 tab(s), Oral, daily    Daily Multiple Vitamins oral tablet, 1 tab(s), Oral, daily    Lipitor 40 mg oral tablet, 40 mg= 1 tab(s), Oral, daily, 3 refills    ZyrTEC 10 mg oral tablet, 10 mg= 1 tab(s), Oral, daily, 3 refills  Allergies    No known  allergies  Social History    Smoking Status     Never smoker     Alcohol      Current, 4-5 times per week, 2 drinks/episode average.     Employment/School      Employed, Work/School description: .     Exercise      Exercise frequency: Never.     Home/Environment      Marital status: . Spouse/Partner name: Elisa. Risks in environment: owns secured gun.     Nutrition/Health      Type of diet: Regular.     Substance Abuse      Past, Marijuana     Tobacco      Former smoker  Family History    CA - Cancer of colon: Grandfather (P).    Cancer: Father.    Cancer of colon: Mother.    Cancer of prostate: Father.  Immunizations      Vaccine Date Status          influenza virus vaccine, inactivated 12/20/2019 Recorded          zoster vaccine, inactivated 08/27/2019 Given          zoster vaccine, inactivated 03/20/2019 Given          influenza virus vaccine, inactivated 12/10/2018 Recorded          influenza virus vaccine, inactivated 12/01/2018 Recorded          pneumococcal (PCV13) 06/29/2018 Given          influenza virus vaccine, inactivated 12/10/2016 Recorded          influenza virus vaccine, inactivated 10/13/2014 Recorded          tetanus/diphth/pertuss (Tdap) adult/adol 07/26/2013 Given          typhoid, inactivated 01/21/2011 Given              Comments : RD lower IM.          hepatitis B adult vaccine 01/21/2011 Given              Comments : RD upper IM.          hepatitis A adult vaccine 01/21/2011 Given          Hib (PRP-T) 01/21/2011 Recorded          influenza, H1N1, inactivated 11/25/2009 Recorded          pneumococcal (PPSV23) 03/17/2003 Recorded          Td 03/17/2003 Recorded          Td 05/16/2001 Recorded

## 2022-02-16 NOTE — NURSING NOTE
Comprehensive Intake Entered On:  6/17/2021 7:54 AM CDT    Performed On:  6/17/2021 7:51 AM CDT by Cayla Crawley               Summary   Chief Complaint :   Px   Weight Measured :   197.3 lb(Converted to: 197 lb 5 oz, 89.494 kg)    Height Measured :   67.5 in(Converted to: 5 ft 7 in, 171.45 cm)    Body Mass Index :   30.44 kg/m2 (HI)    Body Surface Area :   2.06 m2   Systolic Blood Pressure :   133 mmHg (HI)    Diastolic Blood Pressure :   83 mmHg (HI)    Mean Arterial Pressure :   100 mmHg   Peripheral Pulse Rate :   71 bpm   BP Method :   Electronic   HR Method :   Electronic   Temperature Oral :   97.9 DegF(Converted to: 36.6 DegC)    Cayla Crawley - 6/17/2021 7:51 AM CDT   Health Status   Allergies Verified? :   Yes   Medication History Verified? :   Yes   Tobacco Use? :   Former smoker   Cayla Crawley - 6/17/2021 7:51 AM CDT   ID Risk Screen   Recent Travel History :   No recent travel   Family Member Travel History :   No recent travel   Other Exposure to Infectious Disease :   Unknown   COVID-19 Testing Status :   No positive COVID-19 test   Cayla Crawley - 6/17/2021 7:51 AM CDT   Social History   Social History   (As Of: 6/17/2021 7:54:00 AM CDT)   Alcohol:        Current, 4-5 times per week, 2 drinks/episode average.   (Last Updated: 7/2/2018 8:05:32 AM CDT by Akua Pelayo)          Tobacco:        Former smoker, quit more than 30 days ago   (Last Updated: 4/19/2021 5:05:09 PM CDT by Natali Spring MA)          Electronic Cigarette/Vaping:        Electronic Cigarette Use: Never.   (Last Updated: 4/19/2021 5:05:14 PM CDT by Natali Spring MA)          Substance Abuse:        Past, Marijuana   (Last Updated: 7/26/2013 9:55:01 AM CDT by Cayla Crawley)          Employment/School:        Employed, Work/School description: .   (Last Updated: 7/6/2016 3:32:53 PM CDT by Elisa Aguilera)          Home/Environment:        Marital status: .  Spouse/Partner name: Elisa.   Risks in environment: owns secured gun.   (Last Updated: 6/30/2017 1:06:42 PM CDT by Akua Pelayo)          Nutrition/Health:        Type of diet: Regular.   (Last Updated: 6/30/2017 1:07:14 PM CDT by Akua Pelayo)          Exercise:        Exercise frequency: Never.   (Last Updated: 6/30/2017 1:07:21 PM CDT by Akua Pelayo)

## 2022-02-16 NOTE — PROGRESS NOTES
Patient:   YENI ANGULO            MRN: [id number]            FIN: [id number]               Age:   62 years     Sex:  Male     :  1955   Associated Diagnoses:   Annual exam; HLD (Hyperlipidemia); Family History of Prostate Cancer; DJD (Degenerative Joint Disease); Asthma, Mild Intermittent; Family History of Colon Cancer   Author:   Will Morocho MD      Visit Information      Date of Service: 2017 11:46 am  Performing Location: [facility]  Encounter#: [id number]      Primary Care Provider (PCP):  Will Morocho MD    NPLENORA# [id number]      Referring Provider:  Will Morocho MD# [id number]      Chief Complaint   2017 11:48 AM CDT   Annual physical.     Routine Health Care Visit      History of Present Illness   Doing well no concerns Meds as directed             The patient presents for a general exam.  The patient's general health status is described as good.  The patient's diet is described as balanced.  Exercise:  none.  Associated symptoms consist of none.  Additional pertinent history: occasional caffeine use, tobacco use none and alcohol use socially.  no inhaler use 2 years.        Review of Systems   Constitutional:  Negative.    Eye:  Negative.    Ear/Nose/Mouth/Throat:  Negative.    Respiratory:  Negative.    Cardiovascular:  Negative.    Gastrointestinal:  Negative.    Genitourinary:  Negative.    Hematology/Lymphatics:  Negative.    Endocrine:  Negative.    Immunologic:  Negative.    Musculoskeletal:  Negative.    Integumentary:  Negative.    Neurologic:  Negative.    Psychiatric:  Negative.    All other systems reviewed and negative      Health Status   Allergies:    Allergic Reactions (Selected)  No known allergies   Medications:  (Selected)   Prescriptions  Prescribed  ZyrTEC 10 mg oral tablet: 1 tab(s) ( 10 mg ), po, daily, # 90 tab(s), 3 Refill(s), Type: Maintenance, Pharmacy: [facility], 1 tab(s) po daily  simvastatin 40  mg oral tablet: 1 tab(s) ( 40 mg ), po, hs, # 90 tab(s), 3 Refill(s), Type: Maintenance, Pharmacy: [facility] [place], 1 tab(s) po hs  Documented Medications  Documented  Daily Multiple Vitamins oral tablet: 1 tab(s), po, daily, 0 Refill(s), Type: Maintenance  aspirin 81 mg oral tablet: 1 tab(s) ( 81 mg ), po, daily, tab(s), 0 Refill(s), Type: Maintenance   Problem list:    All Problems  HLD (Hyperlipidemia) / ICD-9-.4 / Confirmed  Family History of Colon Cancer / ICD-9-CM V16.0 / Confirmed  Anxiety / ICD-9-.00 / Confirmed  Asthma, Mild Intermittent / ICD-9-.90 / Confirmed  DJD (Degenerative Joint Disease) / ICD-9-.90 / Confirmed  Obesity / ICD-9-.00 / Probable  Adenomatous colon polyp / SNOMED CT 8116973331 / Confirmed  Seasonal allergies / SNOMED CT 625160292 / Confirmed  Hearing loss / SNOMED CT 75687470 / Confirmed  Skin lesions / SNOMED CT 274258859 / Confirmed  Squamous cell carcinoma in situ / SNOMED CT 881552205 / Confirmed  Resolved: CT Cardiac Calcium Score      Histories   Past Medical History:    Active  Squamous cell carcinoma in situ (853415416): Onset on 2017 at 61 years.  Comments:  2017 CST 12:54 PM CST - Elisa Aguilera  Skin, vale of antihelix, right, superior.  Skin lesions (299766255): Onset on 2017 at 61 years.  Comments:  2017 CST 12:52 PM CST - Elisa Aguilera  Lentigines, benign nevi, seborrheic keratoses, cherry angiomas.  HLD (Hyperlipidemia) (272.4)  Family History of Colon Cancer (V16.0)  Anxiety (300.00)  Seasonal allergies (936914510)  Hearing loss (19773975)  Resolved  CT Cardiac Calcium Score:  Resolved.  Comments:  2013 CDT 6:03 PM CDT - Cayla Crawley  Date of test: 2013   Score: 1   Family History:    Grandfather (P)  CA - Cancer of colon  Comments:  2011 11:02 TRANG - Elisa Aguilera  In his 60s.  Mother:  ()  Cause of Death: Colon Cancer  Age at Death: 81 years.   Cancer of colon  Father:  Chava  ()  Cause of Death: Bone and Prostate Cancer  Age at death unknown.   Cancer of prostate  Cancer  Comments:  2011 3:32 PM - Allen TATUM, Emmanuelle  Bone     Procedure history:    Shave biopsy of skin lesion (SNOMED CT 438973143) on 2017 at 61 Years.  Comments:  2017 12:56 PM - Elisa Aguilera  Skin, vale of antihelix, right, superior revealed squamous cell ca in situ.    2017 12:49 PM - Elisa Aguilera  Left distal calf, right knee, right superior vale of antihelix, right lateral temple.  Colonoscopy (SNOMED CT 017262224) performed by Hernandez Alford MD on 3/25/2015 at 60 Years.  Comments:  3/26/2015 3:41 PM - Hernandez Alford MD  Indication: First degree relative with colon cancer <60  Sedation: 3 mg Versed, 150 mcg Fentanyl  Findings: Single divertiulum, Small tubular adenoma 50 cm  Repeat in 5 years  Colonoscopy (SNOMED CT 908782463) in the month of 2009 at 54 Years.  Appendectomy (SNOMED CT 055687779).  Right Knee Arthroscopy (open).   Social History:        Alcohol Assessment            Current, 4-5 times per wk.                     Comments:                      2016 - Elisa Aguilera                     2 drinks.      Tobacco Assessment: Past      Substance Abuse Assessment            Past, Marijuana      Employment and Education Assessment            Employed, Work/School description: .      Home and Environment Assessment            Marital status: .  Spouse/Partner name: Elisa.      Exercise and Physical Activity Assessment                     Comments:                      2016 - Elisa Aguilera                     No regular exercise.        Physical Examination   Vital Signs   2017 11:48 AM CDT Temperature Tympanic 97.4 DegF  LOW    Peripheral Pulse Rate 71 bpm    HR Method Electronic    Systolic Blood Pressure 110 mmHg    Diastolic Blood Pressure 77 mmHg    Mean Arterial Pressure 88 mmHg    BP Site Right arm    BP Method  Electronic      Measurements from flowsheet : Measurements   6/30/2017 11:48 AM CDT Height Measured - Standard 67.5 in    Weight Measured - Standard 196.4 lb    BSA 2.06 m2    Body Mass Index 30.3 kg/m2      General:  Alert and oriented.    Eye:  Pupils are equal, round and reactive to light, Normal conjunctiva.    HENT:  Normocephalic, Tympanic membranes are clear, Oral mucosa is moist.    Neck:  Supple, Non-tender, No lymphadenopathy, No thyromegaly.    Respiratory:  Breath sounds are equal, Symmetrical chest wall expansion.         Respirations: Are within normal limits.         Pattern: Regular.         Breath sounds: Bilateral, Within normal limits.    Cardiovascular:  Normal rate, Regular rhythm, No murmur, Good pulses equal in all extremities, Normal peripheral perfusion, No edema.    Breast:  No mass.         Lymph nodes: Within normal limits.    Gastrointestinal:  Soft, Non-tender, Non-distended, Normal bowel sounds.    Musculoskeletal:  No tenderness, No swelling, degenerative changes noted.    Integumentary:  Warm, Dry.    Neurologic:  No focal deficits.    Cognition and Speech:  Oriented, Speech clear and coherent.    Psychiatric:  Appropriate mood & affect, Normal judgment.       Health Maintenance      Recommendations     Pending (in the next year)        Due            Lipid Disorders Screen (Male) due  06/14/17  and every 1  year(s)           Depression Screen (Male) due  06/14/17  and every 1  year(s)           Lung Cancer Screen (Male) due  06/30/17  and every 1  year(s)           Zoster/Shingles Vaccine due  06/30/17  One-time only        Due In Future            Influenza Vaccine not due until  12/10/17  and every 1  year(s)     Satisfied (in the past 1 year)        Satisfied            Body Mass Index Check (Male) on  06/30/17.           High Blood Pressure Screen (Male) on  06/30/17.           Influenza Vaccine on  12/10/16.           Obesity Screen and Counseling (Male) on  06/30/17.            Tobacco Use Screen (Male) on  06/30/17.          Impression and Plan   Diagnosis     Annual exam (IMZ23-VL Z00.00).     HLD (Hyperlipidemia) (IUU87-TF E78.5).     Family History of Prostate Cancer (PWU55-CW Z80.42).     DJD (Degenerative Joint Disease) (MKS14-MP Q89.8).     Asthma, Mild Intermittent (ZTB72-VS J45.20).     Family History of Colon Cancer (RDV35-IC Z80.0).     Plan:  uses inh seasonally not needed for 3 years  doing great  BMI,Weight loss,exercise,diet discussed  cage/phq9 reviewed with patient.    Patient Instructions:       Counseled: Patient, Diet, Activity, Verbalized understanding.    Counseled:  Patient, Routine exercise and healthy weight maintenance discussed.    Patient Instructions:  Return to clinic in one year for next routine health visit, or sooner if problems or concerns.

## 2022-02-16 NOTE — LETTER
(Inserted Image. Unable to display)            June 18, 2021        YENI ANGULO      [id number] [place] [address]        Dear YENI,    Thank you for selecting [facility]  for your healthcare needs.  Below you will find the results of the recent tests done at our clinic.     Make sure you are taking your atorvastatin.      Result Name Current Result Previous Result Reference Range   Glucose Level (mg/dL)  73 6/17/2021  97 2/5/2020 65 - 99   Cholesterol (mg/dL) ((H)) 260 6/17/2021  158 2/5/2020  - <200   HDL (mg/dL)  42 6/17/2021  42 2/5/2020 > OR = 40 -    LDL ((H)) 184 6/17/2021  96 2/5/2020    Triglyceride (mg/dL) ((H)) 189 6/17/2021  106 2/5/2020  - <150   PSA (ng/mL)  1.2 6/17/2021  1.2 2/5/2020  - < OR = 4.0       Please contact me or my assistant at [phone] if you have any questions.     Sincerely,        Will Morocho MD        What do your labs mean?  Below is a glossary of commonly ordered labs:  LDL   Bad Cholesterol   HDL   Good Cholesterol  AST/ALT   Liver Function   Cr/Creatinine   Kidney Function  Microalbumin   Kidney Function  BUN   Kidney Function  PSA   Prostate    TSH   Thyroid Hormone  HgbA1c   Diabetes Test   Hgb (Hemoglobin)   Red Blood Cells

## 2022-02-16 NOTE — TELEPHONE ENCOUNTER
Order, notes and insurance card are sent to [facility] Scheduling and they will contact patient.

## 2022-02-16 NOTE — PROGRESS NOTES
Patient:   YENI ANGULO            MRN: [id number]            FIN: [id number]               Age:   62 years     Sex:  Male     :  1955   Associated Diagnoses:   Acute lymphadenitis   Author:   Will Morocho MD      Visit Information      Date of Service: 10/17/2017 05:19 pm  Performing Location: [facility]  Encounter#: [id number]      Primary Care Provider (PCP):  Will Morocho MD    NPI# [id number]      Referring Provider:  Will Morocho MD, NPI# [id number]      Chief Complaint   10/17/2017 5:25 PM CDT   Lump on left side of face/jaw x3-4 weeks.        History of Present Illness   chief complaint and symptoms as noted above confirmed with patient   was alot bigger and tender now smaller  no tooth pain  no rash neck pain      Review of Systems   Constitutional:  Negative except as documented in history of present illness.    Eye:  Negative.    Ear/Nose/Mouth/Throat:  Negative.    Respiratory:  Negative.    Cardiovascular:  Negative.    Hematology/Lymphatics:  Negative except as documented in history of present illness.    Integumentary:  Negative.    Neurologic:  Negative.             Health Status   Allergies:    Allergic Reactions (Selected)  No known allergies   Medications:  (Selected)   Prescriptions  Prescribed  Augmentin 875 mg oral tablet: 1 tab(s), PO, q12hr, # 20 tab(s), 0 Refill(s), Type: Maintenance, Pharmacy: [facility], 1 tab(s) po q12 hrs,x10 day(s)  ZyrTEC 10 mg oral tablet: 1 tab(s) ( 10 mg ), po, daily, # 90 tab(s), 3 Refill(s), Type: Maintenance, Pharmacy: [facility], 1 tab(s) po daily  simvastatin 40 mg oral tablet: 1 tab(s) ( 40 mg ), po, hs, # 90 tab(s), 3 Refill(s), Type: Maintenance, Pharmacy: [facility], 1 tab(s) po hs  Documented Medications  Documented  Daily Multiple Vitamins oral tablet: 1 tab(s), po, daily, 0 Refill(s), Type: Maintenance  aspirin 81 mg oral tablet: 1 tab(s)  ( 81 mg ), po, daily, tab(s), 0 Refill(s), Type: Maintenance   Problem list:    All Problems (Selected)  Adenomatous colon polyp / SNOMED CT 5348548421 / Confirmed  Family history of colon cancer / SNOMED CT 265964707 / Confirmed  Hearing loss / SNOMED CT 25992920 / Confirmed  HLD (hyperlipidemia) / SNOMED CT 37102648 / Confirmed  Asthma, mild intermittent / SNOMED CT 9120037632 / Confirmed  Obesity / SNOMED CT 0587350648 / Probable  DJD (degenerative joint disease) / SNOMED CT 8299035283 / Confirmed  Seasonal allergies / SNOMED CT 873055077 / Confirmed  Skin lesions / SNOMED CT 972560411 / Confirmed  Squamous cell carcinoma in situ / SNOMED CT 500019256 / Confirmed      Histories   Past Medical History:    Active  Squamous cell carcinoma in situ (348540244): Onset on 2017 at 61 years.  Comments:  2017 CST 12:54 PM CST - Elisa Aguilera  Skin, vale of antihelix, right, superior.  Skin lesions (472000733): Onset on 2017 at 61 years.  Comments:  2017 CST 12:52 PM CST - Elisa Aguilera  Lentigines, benign nevi, seborrheic keratoses, cherry angiomas.  Asthma, mild intermittent (6745108107): Onset on 2011 at 55 years.  Comments:  2011 CST 3:34 PM CST -     HLD (hyperlipidemia) (01377731)  Family history of colon cancer (654439957)  DJD (degenerative joint disease) (2346435273)  Obesity (6998602368)  Seasonal allergies (258911399)  Hearing loss (70743387)  Resolved  Anxiety (4219332247):  Resolved.  CT Cardiac Calcium Score:  Resolved.  Comments:  2013 CDT 6:03 PM CDT - Cayla Crawley  Date of test: 2013   Score: 1   Family History:    Cancer  Father (Chava, )  Comments:  2011 3:32 PM - Allen TATUM, Emmanuelle  Bone  CA - Cancer of colon  Grandfather (P)  Comments:  2011 11:02 AM - Elisa Aguilera  In his 60s.  Cancer of colon  Mother ()  Cancer of prostate  Father (Chava, )     Procedure history:    Shave biopsy of skin lesion (SNOMED CT 104330768)  on 1/5/2017 at 61 Years.  Comments:  1/11/2017 12:56 PM - Elisa Aguilera  Skin, vale of antihelix, right, superior revealed squamous cell ca in situ.    1/11/2017 12:49 PM - Elisa Aguilera  Left distal calf, right knee, right superior vale of antihelix, right lateral temple.  Colonoscopy (SNOMED CT 722948304) performed by Hernandez Alford MD on 3/25/2015 at 60 Years.  Comments:  3/26/2015 3:41 PM - Hernandez Alford MD  Indication: First degree relative with colon cancer <60  Sedation: 3 mg Versed, 150 mcg Fentanyl  Findings: Single divertiulum, Small tubular adenoma 50 cm  Repeat in 5 years  Colonoscopy (SNOMED CT 872857449) in the month of 12/2009 at 54 Years.  Appendectomy (SNOMED CT 314052663).  Right Knee Arthroscopy (open).   Social History:        Alcohol Assessment            Current, 3-4 times per week, 2 drinks/episode average.  3 drinks/episode maximum.      Tobacco Assessment            Former smoker      Substance Abuse Assessment            Past, Marijuana      Employment and Education Assessment            Employed, Work/School description: .      Home and Environment Assessment            Marital status: .  Spouse/Partner name: Elisa.  Risks in environment: owns secured gun.      Nutrition and Health Assessment            Type of diet: Regular.      Exercise and Physical Activity Assessment            Exercise frequency: Never.        Physical Examination   Vital Signs   10/17/2017 5:25 PM CDT Temperature Tympanic 97.9 DegF    Peripheral Pulse Rate 71 bpm    HR Method Electronic    Systolic Blood Pressure 122 mmHg    Diastolic Blood Pressure 84 mmHg    Mean Arterial Pressure 97 mmHg    BP Method Electronic      Measurements from flowsheet : Measurements   10/17/2017 5:25 PM CDT   Weight Measured - Standard                193.8 lb     General:  Alert and oriented, No acute distress.    Eye:  Normal conjunctiva.    HENT:  Tympanic membranes are clear, No pharyngeal erythema.     Neck:  Supple, Non-tender, No lymphadenopathy.    Lymphatics:       Lymphatic exam: Left, mental node mil;d swelling.    Neurologic:  Alert, Oriented.       Impression and Plan   Diagnosis     Acute lymphadenitis (IEL41-DL L04.9).     Course:  Improving.    Plan:  see meds  ent refer if not better in 3-4 weeks.    Patient Instructions:       Counseled: Patient, Regarding diagnosis, Regarding treatment, Regarding medications.

## 2022-02-16 NOTE — PROGRESS NOTES
Patient:   YENI ANGULO            MRN: [id number]            FIN: [id number]               Age:   64 years     Sex:  Male     :  1955   Associated Diagnoses:   TMJ inflammation   Author:   Will Morocho MD      Visit Information      Date of Service: 2019 06:44 pm  Performing Location: [facility]  Encounter#: [id number]      Primary Care Provider (PCP):  Will Morocho MD    NPI# [id number]      Referring Provider:  Will Morocho MD# [id number]      Chief Complaint   2019 6:46 PM CDT    Pt c/o L ear pain x 2 weeks.        History of Present Illness   chief complaint and symptoms as noted above confirmed with patient   jaw clicks and grinds teeth      Review of Systems   Constitutional:  Negative except as documented in history of present illness.    Eye:  Negative.    Ear/Nose/Mouth/Throat:  Negative except as documented in history of present illness.    Respiratory:  Negative.    Neurologic:  Negative.       Health Status   Allergies:    Allergic Reactions (Selected)  No known allergies   Medications:  (Selected)   Prescriptions  Prescribed  ZyrTEC 10 mg oral tablet: = 1 tab(s) ( 10 mg ), po, daily, # 90 tab(s), 3 Refill(s), Type: Maintenance, Pharmacy: [facility], 1 tab(s) Oral daily  simvastatin 40 mg oral tablet: = 1 tab(s) ( 40 mg ), po, hs, # 90 tab(s), 3 Refill(s), Type: Maintenance, Pharmacy: [facility], 1 tab(s) Oral hs  Documented Medications  Documented  Daily Multiple Vitamins oral tablet: 1 tab(s), po, daily, 0 Refill(s), Type: Maintenance  aspirin 81 mg oral tablet: 1 tab(s) ( 81 mg ), po, daily, tab(s), 0 Refill(s), Type: Maintenance,    Medications          *denotes recorded medication          *aspirin 81 mg oral tablet: 81 mg, 1 tab(s), po, daily, tab(s).          ZyrTEC 10 mg oral tablet: 10 mg, 1 tab(s), po, daily, 90 tab(s), 3 Refill(s).          *Daily Multiple Vitamins oral tablet: 1  tab(s), po, daily.          simvastatin 40 mg oral tablet: 40 mg, 1 tab(s), po, hs, 90 tab(s), 3 Refill(s).       Problem list:    All Problems (Selected)  Adenomatous colon polyp / SNOMED CT 9263756236 / Confirmed  Family history of colon cancer / SNOMED CT 534601735 / Confirmed  Hearing loss / SNOMED CT 99134834 / Confirmed  HLD (hyperlipidemia) / SNOMED CT 49867415 / Confirmed  Asthma, mild intermittent / SNOMED CT 2412703719 / Confirmed  Obesity / SNOMED CT 1500526930 / Probable  DJD (degenerative joint disease) / SNOMED CT 3773157129 / Confirmed  Seasonal allergies / SNOMED CT 337133819 / Confirmed  Skin lesions / SNOMED CT 433523831 / Confirmed  Squamous cell carcinoma in situ / SNOMED CT 134396917 / Confirmed      Histories   Past Medical History:    Active  Squamous cell carcinoma in situ (403789931): Onset on 2017 at 61 years.  Comments:  2017 CST 12:54 PM CST - Elisa Aguilera  Skin, vale of antihelix, right, superior.  Skin lesions (938756847): Onset on 2017 at 61 years.  Comments:  2017 CST 12:52 PM CST - Elisa Aguilera  Lentigines, benign nevi, seborrheic keratoses, cherry angiomas.  Asthma, mild intermittent (0175182844): Onset on 2011 at 55 years.  Comments:  2011 CST 3:34 PM CST -     HLD (hyperlipidemia) (06685167)  Family history of colon cancer (365950608)  DJD (degenerative joint disease) (2614792434)  Obesity (0761399814)  Seasonal allergies (113716662)  Hearing loss (89201302)  Resolved  Anxiety (5247754122):  Resolved.  CT Cardiac Calcium Score:  Resolved.  Comments:  2013 CDT 6:03 PM CDT - Cayla Crawley  Date of test: 2013   Score: 1   Family History:    Cancer  Father (Chava, )  Comments:  2011 3:32 PM CST - Allen TATUM, Emmanuelle  Bone  CA - Cancer of colon  Grandfather (P)  Comments:  2011 11:02 AM ANTONI - Elisa Aguilera  In his 60s.  Cancer of colon  Mother ()  Cancer of prostate  Father (Chava, )     Procedure  history:    Shave biopsy of skin lesion (SNOMED CT 108376393) on 1/5/2017 at 61 Years.  Comments:  1/11/2017 12:56 PM CST - Elisa Aguilera  Skin, vale of antihelix, right, superior revealed squamous cell ca in situ.    1/11/2017 12:49 PM CST - Elisa Aguilear  Left distal calf, right knee, right superior vale of antihelix, right lateral temple.  Colonoscopy (SNOMED CT 289548378) performed by Hernandez Alford MD on 3/25/2015 at 60 Years.  Comments:  3/26/2015 3:41 PM CDT - Hernandez Alford MD  Indication: First degree relative with colon cancer <60  Sedation: 3 mg Versed, 150 mcg Fentanyl  Findings: Single divertiulum, Small tubular adenoma 50 cm  Repeat in 5 years  Colonoscopy (SNOMED CT 886362041) in the month of 12/2009 at 54 Years.  Appendectomy (SNOMED CT 570167085).  Right Knee Arthroscopy (open).   Social History:        Alcohol Assessment            Current, 4-5 times per week, 2 drinks/episode average.      Tobacco Assessment            Former smoker      Substance Abuse Assessment            Past, Marijuana      Employment and Education Assessment            Employed, Work/School description: .      Home and Environment Assessment            Marital status: .  Spouse/Partner name: Elisa.  Risks in environment: owns secured gun.      Nutrition and Health Assessment            Type of diet: Regular.      Exercise and Physical Activity Assessment            Exercise frequency: Never.        Physical Examination   Vital Signs   8/27/2019 6:46 PM CDT Temperature Tympanic 98.1 DegF    Peripheral Pulse Rate 68 bpm    Pulse Site Radial artery    HR Method Manual    Systolic Blood Pressure 122 mmHg    Diastolic Blood Pressure 74 mmHg    Mean Arterial Pressure 90 mmHg    BP Site Right arm    BP Method Manual      Measurements from flowsheet : Measurements   8/27/2019 6:46 PM CDT    Weight Measured - Standard                195 lb     General:  Alert and oriented, No acute distress.    Eye:   Normal conjunctiva.    HENT:  Tympanic membranes are clear, No pharyngeal erythema.         Head: Left, Temporomandibular joint, Tenderness.    Neck:  Supple, Non-tender, No lymphadenopathy.    Neurologic:  Alert, Oriented.       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     Course:  Not progressing as expected.    Plan:  soft diet  bite guard  fu 1 week if not better sooner if worse.    Patient Instructions:       Counseled: Patient, Regarding diagnosis, Regarding treatment, Regarding medications, Diet.

## 2022-02-16 NOTE — TELEPHONE ENCOUNTER
---------------------  From: Jose L IGNACIOBetsy   Sent: 4/1/2020 12:20:15 PM CDT  Subject: ACT f/u     Spoke to pt on phone. ACT completed and updated in chart.     Patients with Asthma are at a higher risk for RESPIRATORY concerns, so we are reaching out to you to see if you have a few minutes to complete your ACT  to check to see how well your Asthma is controlled. If we find any concerns, we can offer you a telephone visit. Please answer the following questions and get back to us when you are able.     1) In the past 4 weeks, how much of the time did your asthma keep you from getting as much done at work, school or at home?   1 = All of the time   2 = Most of the time   3 = Some of the time   4 = A little of the time    5 = None of the time  2)  During the past 4 weeks, how often have you had shortness of breath?   1 = More than once a day   2 = Once a day   3 = 3 to 6 times a week   4 = Once or twice a week   5 = Not at all  3) During the past 4 weeks, how often did your asthma symptoms (wheezing, coughing, shortness of breath, chest tightness or pain) wake you up at night or earlier than usual in the morning?   1 = 4 or more nights a week   2 = 2 or 3 nights a week   3 = Once a week   4 = Once or twice   5 = Not at all  4) During the past 4 weeks, how often have you used your rescue inhaler or nebulizer medication (such as albuterol)?   1 = 3 or more times per day   2 = 1 or 2 times per day   3 = 2 or 3 times per week   4 = Once a week or less   5 = Not at all  5) How would you rate your asthma control during the past 4 weeks?   1 = Not controlled at all   2 = Poorly controlled   3 = Somewhat controlled   4 = Well controlled   5 = Completely controlled    If your score is 19 or less, your asthma may not be under control. Be sure to talk with your doctor about your results. The answers below should not be added to your total score. These answers should be discussed with your doctor.    In the past 12 months,  how many emergency department visits have you had due to asthma (that did not result in a hospitalization)?  _0__   In the past 12 months, how many inpatient hospitalizations have you had due to asthma?  _0____

## 2022-02-16 NOTE — TELEPHONE ENCOUNTER
Entered by Bell Galan CMA on March 31, 2021 2:03:48 PM CDT  ---------------------  From: Bell Galan CMA   To: [facility]    Sent: 3/31/2021 2:03:48 PM CDT  Subject: Medication Management     ** Submitted: **  Order:atorvastatin (atorvastatin 40 mg oral tablet)  1 tab(s)  Oral  daily  Qty:  30 tab(s)        Refills:  0          Substitutions Allowed     Route To [facility]    Signed by Bell Galan CMA  3/31/2021 7:03:00 PM [facility]    ** Submitted: **  Complete:atorvastatin (Lipitor 40 mg oral tablet)   Signed by Bell Galan CMA  3/31/2021 7:03:00 PM [facility]    ** Not Approved:  **  atorvastatin (ATORVASTATIN CALCIUM 40MG TABS)  TAKE ONE TABLET BY MOUTH EVERY DAY  Qty:  90 unknown unit        Days Supply:  90        Refills:  3          Substitutions Allowed     Route To [facility]   Signed by Bell Galan CMA            ------------------------------------------  From: [facility]  To: Will Morocho MD  Sent: March 30, 2021 6:17:13 PM CDT  Subject: Medication Management  Due: March 24, 2021 8:29:58 PM CDT     ** On Hold Pending Signature **     Drug: atorvastatin (Lipitor 40 mg oral tablet), TAKE ONE TABLET BY MOUTH EVERY DAY  Quantity: 90 unknown unit  Days Supply: 90  Refills: 2  Substitutions Allowed  Notes from Pharmacy:     Dispensed Drug: atorvastatin (atorvastatin 40 mg oral tablet), TAKE ONE TABLET BY MOUTH EVERY DAY  Quantity: 90 unknown unit  Days Supply: 90  Refills: 3  Substitutions Allowed  Notes from Pharmacy:  ------------------------------------------2/5/20 px  rtc due now

## 2022-02-16 NOTE — PROGRESS NOTES
Patient:   YENI ANGULO            MRN: [id number]            FIN: [id number]               Age:   66 years     Sex:  Male     :  1955   Associated Diagnoses:   Paronychia, finger   Author:   Romain Fu MD      Visit Information      Date of Service: 2021 05:00 pm  Performing Location: [facility]  Encounter#: [id number]      Primary Care Provider (PCP):  Will Morocho MD    NPI# [id number]      Referring Provider:  Romain Fu MD    NPI# [id number]      Chief Complaint   2021 5:04 PM CDT    c/o left middle finger red, swollen, painful/tender to touch.  denies injury.        Subjective   Chief complaint 2021 5:04 PM CDT    c/o left middle finger red, swollen, painful/tender to touch.  denies injury.  .     see chief complaint as noted above and confirmed with the patient      Health Status   Allergies:    Allergic Reactions (Selected)  No known allergies   Medications:  (Selected)   Prescriptions  Prescribed  ZyrTEC 10 mg oral tablet: = 1 tab(s) ( 10 mg ), po, daily, # 90 tab(s), 3 Refill(s), Type: Maintenance, Pharmacy: [facility], 1 tab(s) Oral daily, 67.5, in, 20 16:10:00 CDT, Height Measured, 197, lb, 20 16:10:00 CDT, Weight Measured  atorvastatin 40 mg oral tablet: = 1 tab(s), Oral, daily, # 30 tab(s), 0 Refill(s), Type: Maintenance, Pharmacy: [facility], Needs appt, 1 tab(s) Oral daily, 67.5, in, 20 16:10:00 CDT, Height Measured, 197, lb, 20 16:10:00 CDT, Weight Measured  sulfamethoxazole-trimethoprim 800 mg-160 mg oral tablet: 1 tab(s), PO, BID, # 20 tab(s), 0 Refill(s), Type: Maintenance, Pharmacy: [facility], 1 tab(s) Oral bid,x10 day(s), 67.5, in, 21 17:04:00 CDT, Height Measured, 197.6, lb, 21 17:04:00 CDT, Weight Measured  Documented Medications  Documented  Daily Multiple Vitamins oral tablet: 1 tab(s), po, daily, 0 Refill(s), Type:  Maintenance  aspirin 81 mg oral tablet: 1 tab(s) ( 81 mg ), po, daily, tab(s), 0 Refill(s), Type: Maintenance,    Medications          *denotes recorded medication          *aspirin 81 mg oral tablet: 81 mg, 1 tab(s), po, daily, tab(s).          atorvastatin 40 mg oral tablet: 1 tab(s), Oral, daily, 30 tab(s), 0 Refill(s).          ZyrTEC 10 mg oral tablet: 10 mg, 1 tab(s), po, daily, 90 tab(s), 3 Refill(s).          *Daily Multiple Vitamins oral tablet: 1 tab(s), po, daily.          sulfamethoxazole-trimethoprim 800 mg-160 mg oral tablet: 1 tab(s), PO, BID, for 10 day(s), 20 tab(s), 0 Refill(s).       Problem list:    All Problems (Selected)  HLD (hyperlipidemia) / 22080250 / Confirmed  Family history of colon cancer / 013568857 / Confirmed  DJD (degenerative joint disease) / 0239086283 / Confirmed  Obesity / 5815868440 / Probable  Adenomatous colon polyp / 9421821675 / Confirmed  Seasonal allergies / 638981135 / Confirmed  Hearing loss / 14026438 / Confirmed  Asthma, mild intermittent / 0586856415 / Confirmed  Skin lesions / 595395869 / Confirmed  Lentigines, benign nevi, seborrheic keratoses, cherry angiomas.  Squamous cell carcinoma in situ / 610393329 / Confirmed  Skin, vale of antihelix, right, superior.      Objective   Vital Signs   4/19/2021 5:04 PM CDT Temperature Tympanic 98.6 DegF    Peripheral Pulse Rate 84 bpm    Pulse Site Radial artery    HR Method Manual    Systolic Blood Pressure 110 mmHg    Diastolic Blood Pressure 68 mmHg    Mean Arterial Pressure 82 mmHg    BP Site Right arm    BP Method Manual    Oxygen Saturation 93 %  LOW      Measurements from flowsheet : Measurements   4/19/2021 5:04 PM CDT Height Measured - Standard 67.5 in    Weight Measured - Standard 197.6 lb    BSA 2.06 m2    Body Mass Index 30.49 kg/m2  HI      base of nail with swelling and redness  skin around nails is dry and cracked         Impression and Plan   Assessment and Plan:          Diagnosis: Paronychia, finger ([id number]  L03.019).         Course: used 11 blade to incise along nail,   expressed some pus and swelling resolved  will take septra ds po bid for 10 days  keep finger clean  skin moisturizer  Reviewed expected course, what to watch for and when to return..

## 2022-06-15 ENCOUNTER — OFFICE VISIT (OUTPATIENT)
Dept: FAMILY MEDICINE | Facility: CLINIC | Age: 67
End: 2022-06-15
Payer: COMMERCIAL

## 2022-06-15 VITALS
WEIGHT: 194 LBS | TEMPERATURE: 99.4 F | RESPIRATION RATE: 20 BRPM | DIASTOLIC BLOOD PRESSURE: 80 MMHG | HEART RATE: 88 BPM | BODY MASS INDEX: 28.73 KG/M2 | HEIGHT: 69 IN | OXYGEN SATURATION: 95 % | SYSTOLIC BLOOD PRESSURE: 122 MMHG

## 2022-06-15 DIAGNOSIS — T16.2XXA FOREIGN BODY OF LEFT EAR, INITIAL ENCOUNTER: ICD-10-CM

## 2022-06-15 DIAGNOSIS — J40 BRONCHITIS: Primary | ICD-10-CM

## 2022-06-15 DIAGNOSIS — Z20.822 SUSPECTED 2019 NOVEL CORONAVIRUS INFECTION: ICD-10-CM

## 2022-06-15 PROBLEM — H91.90 HEARING LOSS: Status: ACTIVE | Noted: 2022-06-15

## 2022-06-15 PROBLEM — M19.90 OSTEOARTHRITIS: Status: ACTIVE | Noted: 2022-06-15

## 2022-06-15 PROBLEM — E78.5 HYPERLIPIDEMIA: Status: ACTIVE | Noted: 2022-06-15

## 2022-06-15 PROBLEM — E66.9 OBESITY: Status: ACTIVE | Noted: 2022-06-15

## 2022-06-15 PROBLEM — D12.6 ADENOMATOUS POLYP OF COLON: Status: ACTIVE | Noted: 2022-06-15

## 2022-06-15 PROBLEM — D09.9 SQUAMOUS CELL CARCINOMA IN SITU (SCCIS): Status: ACTIVE | Noted: 2017-01-06

## 2022-06-15 PROBLEM — J30.2 SEASONAL ALLERGIC RHINITIS: Status: ACTIVE | Noted: 2022-06-15

## 2022-06-15 PROCEDURE — U0003 INFECTIOUS AGENT DETECTION BY NUCLEIC ACID (DNA OR RNA); SEVERE ACUTE RESPIRATORY SYNDROME CORONAVIRUS 2 (SARS-COV-2) (CORONAVIRUS DISEASE [COVID-19]), AMPLIFIED PROBE TECHNIQUE, MAKING USE OF HIGH THROUGHPUT TECHNOLOGIES AS DESCRIBED BY CMS-2020-01-R: HCPCS | Performed by: PHYSICIAN ASSISTANT

## 2022-06-15 PROCEDURE — 69200 CLEAR OUTER EAR CANAL: CPT | Mod: LT | Performed by: PHYSICIAN ASSISTANT

## 2022-06-15 PROCEDURE — U0005 INFEC AGEN DETEC AMPLI PROBE: HCPCS | Performed by: PHYSICIAN ASSISTANT

## 2022-06-15 PROCEDURE — 99213 OFFICE O/P EST LOW 20 MIN: CPT | Mod: CS | Performed by: PHYSICIAN ASSISTANT

## 2022-06-15 RX ORDER — ALBUTEROL SULFATE 90 UG/1
2 AEROSOL, METERED RESPIRATORY (INHALATION) EVERY 6 HOURS
Qty: 18 G | Refills: 1 | Status: SHIPPED | OUTPATIENT
Start: 2022-06-15 | End: 2023-09-26

## 2022-06-15 RX ORDER — ATORVASTATIN CALCIUM 40 MG/1
TABLET, FILM COATED ORAL
COMMUNITY
Start: 2021-06-17 | End: 2022-08-26

## 2022-06-15 RX ORDER — BENZONATATE 200 MG/1
200 CAPSULE ORAL 3 TIMES DAILY PRN
Qty: 30 CAPSULE | Refills: 0 | Status: SHIPPED | OUTPATIENT
Start: 2022-06-15 | End: 2023-10-24

## 2022-06-15 RX ORDER — CETIRIZINE HYDROCHLORIDE 10 MG/1
10 TABLET ORAL
COMMUNITY
Start: 2021-06-17 | End: 2022-09-28

## 2022-06-15 NOTE — PROGRESS NOTES
"  Assessment & Plan     Bronchitis  Discussed with pt sx improvement. This is likely viral. No indication for cxr at this time given not tachypnea, no chest pain, normal O2 sats, lungs clear on exam.  No signs of CHF with no orthopnea, PND or edema change.   He has had some wheezing at home but currently not wheezy.  Has used inhalers in the past primarily with allergy related RAD.    Will try albuterol.   Tessalon for cough.    Covid 19 testing.    If covid 19 testing is negative, if sx are not improved with conservative measures, worsening or persist greater than 10 days would consider abx trial.  If worsening consider CXR.  Pt agreeable with plan.  At the end of the encounter, I discussed results, diagnosis, medications. Discussed red flags for immediate return to clinic/ER, as well as indications for follow up if no improvement. Patient understood and agreed to plan. Patient was stable for discharge          - albuterol (PROAIR HFA/PROVENTIL HFA/VENTOLIN HFA) 108 (90 Base) MCG/ACT inhaler  Dispense: 18 g; Refill: 1  - benzonatate (TESSALON) 200 MG capsule  Dispense: 30 capsule; Refill: 0  - Symptomatic; Yes; 6/13/2022 COVID-19 Virus (Coronavirus) by PCR Nose  - Symptomatic; Yes; 6/12/2022 COVID-19 Virus (Coronavirus) by PCR Nose    Infection due to 2019 novel coronavirus    - benzonatate (TESSALON) 200 MG capsule  Dispense: 30 capsule; Refill: 0  - Symptomatic; Yes; 6/13/2022 COVID-19 Virus (Coronavirus) by PCR Nose  - Symptomatic; Yes; 6/12/2022 COVID-19 Virus (Coronavirus) by PCR Nose    Foreign body of left ear, initial encounter  Removal as above                 BMI:   Estimated body mass index is 28.65 kg/m  as calculated from the following:    Height as of this encounter: 1.753 m (5' 9\").    Weight as of this encounter: 88 kg (194 lb).       Pt in need of wellness exam.  He will make appt in the next few weeks.      TAYLOR Aguilar [facility]    Silvia Gaspar is a " "67 year old, presenting for the following health issues:  Cough (X 4 days.  Had a negative home test this morning) and Shortness of Breath  4 day hx of uri sx that initially seemed like his allergies.    Little sob, feels congested in the chest.  No labored breathing. No chest pain.   Has wheezed in the past with allergies.    No fever at home.  Does not feel feverish.    No nausea, vomiting, diarrhea.    Feels wheezy.    Does have allergies.  Uses zyrtec.    No edema        Review of Systems   Constitutional, HEENT, cardiovascular, pulmonary, gi and gu systems are negative, except as otherwise noted.      Patient Active Problem List   Diagnosis     Adenomatous polyp of colon     Hearing loss     Hyperlipidemia     Mild intermittent asthma     Obesity     Osteoarthritis     Seasonal allergic rhinitis     Squamous cell carcinoma in situ (SCCIS)     No results found for any visits on 06/15/22.    Objective    /80 (BP Location: Right arm, Patient Position: Sitting, Cuff Size: Adult Large)   Pulse 88   Temp 99.4  F (37.4  C) (Tympanic)   Resp 20   Ht 1.753 m (5' 9\")   Wt 88 kg (194 lb)   SpO2 95%   BMI 28.65 kg/m    Body mass index is 28.65 kg/m .  Physical Exam   Pt is in no acute distress and appears well  Ears patent on the R, L obstructed by FB of hearing aid tip.  Removed with alligator, canal is clear, no errosions or bleeding.  Pt not certain how long it would have been there.  :  TM s intact, non-injected. All land marks easily visibile    Nasal mucosa is non-edematous, no discharge.    Pharynx: non erythematous, tonsils non hypertrophied, No exudate   Neck supple: no adenopathy  Lungs: CTA  Heart: RRR, no murmur, no thrills or heaves   Ext: no edema  Skin: no rashes                  .  ..  "

## 2022-06-16 LAB — SARS-COV-2 RNA RESP QL NAA+PROBE: POSITIVE

## 2022-06-17 ENCOUNTER — TELEPHONE (OUTPATIENT)
Dept: FAMILY MEDICINE | Facility: CLINIC | Age: 67
End: 2022-06-17

## 2022-06-17 ENCOUNTER — VIRTUAL VISIT (OUTPATIENT)
Dept: FAMILY MEDICINE | Facility: CLINIC | Age: 67
End: 2022-06-17
Payer: COMMERCIAL

## 2022-06-17 DIAGNOSIS — U07.1 INFECTION DUE TO 2019 NOVEL CORONAVIRUS: Primary | ICD-10-CM

## 2022-06-17 PROCEDURE — 99213 OFFICE O/P EST LOW 20 MIN: CPT | Mod: 95 | Performed by: PHYSICIAN ASSISTANT

## 2022-06-17 NOTE — TELEPHONE ENCOUNTER
Reason for Call:  Other returning call    Detailed comments: Patient returning call from clinic that he received this morning.     Phone Number Patient can be reached at: Cell number on file:    Telephone Information:   Mobile [phone]       Best Time: anytime    Can we leave a detailed message on this number? YES    Call taken on 6/17/2022 at 8:27 AM by Ely Arriola

## 2022-06-17 NOTE — TELEPHONE ENCOUNTER
Hubert from [facility] need clarification of direction given to patient regarding Paxlovid.    Please advise if patient was notified to stop Atorvastatin for 5 days due to interactions.    Pharmacy requesting a callback please.

## 2022-06-17 NOTE — PROGRESS NOTES
RX PROGRESS NOTE: Antibiotic Timeout Assessment      Indication for therapy: UTI    Antimicrobial agents assessed: cefazolin    Assessment/Plan:  Pharmacy has contacted Dr. Tamayo to discuss antibiotic plan after 72 hours of empiric therapy.  Plan to de-escalate antibiotic therapy to cefdinir for a total duration of 7 more days.  Orders will be placed by provider to reflect this plan (Currently discharging on cefdinir).    Pharmacy will continue to monitor and assess microbiology, duration, and appropriateness of antibiotic therapy and contact provider as appropriate.    Thank you,    Nelida Mccarthy RPH  1/4/2022 11:55 AM  Contact # [phone]     "Hubert is a 67 year old who is being evaluated via a billable telephone visit.          Assessment & Plan     Infection due to 2019 novel coronavirus  Pt is a candidate for Paxlovid tx. He was instructed to hold is statin x 8 days.  Push fluids, rest and ibuprofen or tylenol for comfort.    RTC for persistent or worsening sx.   He will use albuterol. If any severe or worsening sob, difficulty breathing will follow-up with pcp or seek care.  Pt agreeable with plan   - nirmatrelvir and ritonavir (PAXLOVID) therapy pack  Dispense: 30 each; Refill: 0       BMI:   Estimated body mass index is 28.65 kg/m  as calculated from the following:    Height as of 6/15/22: 1.753 m (5' 9\").    Weight as of 6/15/22: 88 kg (194 lb).       Estephania Abrams PA-C  [facility]    Subjective   Hubert is a 67 year old  presenting for the following health issues:  Covid Concern (Positive covid test 6/15.  Pt is still c/o cough and chest congestion.  Sx started 6/14/22)  Pt finds the tessalon and albuterol to be helpful.    No severe shortness of breath or difficulty  Breathing able to be active at home. Taking fluids well.  No chest pain.    Pt has no known CKD and no risk factors for it.     Review of Systems   Constitutional, HEENT, cardiovascular, pulmonary, gi and gu systems are negative, except as otherwise noted.      Objective           Vitals:  No vitals were obtained today due to virtual visit.    Physical Exam     Patient is alert and oriented. Able to speak in full sentences. No wheezing or cough heard during telephone conversation. Mood is appropriate.          Phone call duration: 15 minutes    .  ..  "

## 2022-07-24 ENCOUNTER — HEALTH MAINTENANCE LETTER (OUTPATIENT)
Age: 67
End: 2022-07-24

## 2022-08-25 ENCOUNTER — OFFICE VISIT (OUTPATIENT)
Dept: FAMILY MEDICINE | Facility: CLINIC | Age: 67
End: 2022-08-25
Payer: COMMERCIAL

## 2022-08-25 VITALS
WEIGHT: 181.6 LBS | BODY MASS INDEX: 26.9 KG/M2 | HEIGHT: 69 IN | SYSTOLIC BLOOD PRESSURE: 115 MMHG | TEMPERATURE: 97.5 F | HEART RATE: 67 BPM | DIASTOLIC BLOOD PRESSURE: 75 MMHG

## 2022-08-25 DIAGNOSIS — E78.5 HYPERLIPIDEMIA, UNSPECIFIED HYPERLIPIDEMIA TYPE: ICD-10-CM

## 2022-08-25 DIAGNOSIS — Z12.5 SCREENING FOR PROSTATE CANCER: ICD-10-CM

## 2022-08-25 DIAGNOSIS — F51.01 PRIMARY INSOMNIA: ICD-10-CM

## 2022-08-25 DIAGNOSIS — Z13.1 SCREENING FOR DIABETES MELLITUS: ICD-10-CM

## 2022-08-25 DIAGNOSIS — D12.6 ADENOMATOUS POLYP OF COLON, UNSPECIFIED PART OF COLON: ICD-10-CM

## 2022-08-25 DIAGNOSIS — Z00.00 WELL ADULT EXAM: Primary | ICD-10-CM

## 2022-08-25 LAB
CHOLEST SERPL-MCNC: 157 MG/DL
FASTING STATUS PATIENT QL REPORTED: YES
GLUCOSE SERPL-MCNC: 102 MG/DL (ref 70–99)
HDLC SERPL-MCNC: 48 MG/DL
LDLC SERPL CALC-MCNC: 98 MG/DL
NONHDLC SERPL-MCNC: 109 MG/DL
PSA SERPL-MCNC: 1.34 NG/ML (ref 0–4.5)
TRIGL SERPL-MCNC: 56 MG/DL

## 2022-08-25 PROCEDURE — 90677 PCV20 VACCINE IM: CPT | Performed by: FAMILY MEDICINE

## 2022-08-25 PROCEDURE — 99397 PER PM REEVAL EST PAT 65+ YR: CPT | Mod: 25 | Performed by: FAMILY MEDICINE

## 2022-08-25 PROCEDURE — 80061 LIPID PANEL: CPT | Performed by: FAMILY MEDICINE

## 2022-08-25 PROCEDURE — 82947 ASSAY GLUCOSE BLOOD QUANT: CPT | Mod: QW | Performed by: FAMILY MEDICINE

## 2022-08-25 PROCEDURE — 99214 OFFICE O/P EST MOD 30 MIN: CPT | Mod: 25 | Performed by: FAMILY MEDICINE

## 2022-08-25 PROCEDURE — 36415 COLL VENOUS BLD VENIPUNCTURE: CPT | Performed by: FAMILY MEDICINE

## 2022-08-25 PROCEDURE — G0103 PSA SCREENING: HCPCS | Performed by: FAMILY MEDICINE

## 2022-08-25 PROCEDURE — 90471 IMMUNIZATION ADMIN: CPT | Performed by: FAMILY MEDICINE

## 2022-08-25 RX ORDER — TRAZODONE HYDROCHLORIDE 50 MG/1
50 TABLET, FILM COATED ORAL AT BEDTIME
Qty: 30 TABLET | Refills: 11 | Status: SHIPPED | OUTPATIENT
Start: 2022-08-25 | End: 2023-08-08

## 2022-08-25 ASSESSMENT — LIFESTYLE VARIABLES
HOW MANY STANDARD DRINKS CONTAINING ALCOHOL DO YOU HAVE ON A TYPICAL DAY: 1 OR 2
SKIP TO QUESTIONS 9-10: 1
HOW OFTEN DO YOU HAVE A DRINK CONTAINING ALCOHOL: 2-3 TIMES A WEEK
HOW OFTEN DO YOU HAVE SIX OR MORE DRINKS ON ONE OCCASION: NEVER
AUDIT-C TOTAL SCORE: 3

## 2022-08-25 ASSESSMENT — ASTHMA QUESTIONNAIRES
ACT_TOTALSCORE: 25
QUESTION_5 LAST FOUR WEEKS HOW WOULD YOU RATE YOUR ASTHMA CONTROL: COMPLETELY CONTROLLED
QUESTION_3 LAST FOUR WEEKS HOW OFTEN DID YOUR ASTHMA SYMPTOMS (WHEEZING, COUGHING, SHORTNESS OF BREATH, CHEST TIGHTNESS OR PAIN) WAKE YOU UP AT NIGHT OR EARLIER THAN USUAL IN THE MORNING: NOT AT ALL
QUESTION_2 LAST FOUR WEEKS HOW OFTEN HAVE YOU HAD SHORTNESS OF BREATH: NOT AT ALL
QUESTION_4 LAST FOUR WEEKS HOW OFTEN HAVE YOU USED YOUR RESCUE INHALER OR NEBULIZER MEDICATION (SUCH AS ALBUTEROL): NOT AT ALL
ACT_TOTALSCORE: 25
QUESTION_1 LAST FOUR WEEKS HOW MUCH OF THE TIME DID YOUR ASTHMA KEEP YOU FROM GETTING AS MUCH DONE AT WORK, SCHOOL OR AT HOME: NONE OF THE TIME

## 2022-08-25 ASSESSMENT — ENCOUNTER SYMPTOMS
CONSTIPATION: 0
DIZZINESS: 0
PALPITATIONS: 0
MYALGIAS: 0
SORE THROAT: 0
JOINT SWELLING: 0
NERVOUS/ANXIOUS: 0
DIARRHEA: 0
HEARTBURN: 0
PARESTHESIAS: 0
COUGH: 0
ARTHRALGIAS: 0
HEMATOCHEZIA: 0
NAUSEA: 0
ABDOMINAL PAIN: 0
FREQUENCY: 0
DYSURIA: 0
HEADACHES: 0
FEVER: 0
EYE PAIN: 0
CHILLS: 0
HEMATURIA: 0
SHORTNESS OF BREATH: 0
WEAKNESS: 0

## 2022-08-25 ASSESSMENT — ACTIVITIES OF DAILY LIVING (ADL): CURRENT_FUNCTION: NO ASSISTANCE NEEDED

## 2022-08-25 NOTE — LETTER
August 26, 2022      Hubert Saenz  [id number] [id number]        Dear ,    We are writing to inform you of your test results.    Your test results fall within the expected range(s) or remain unchanged from previous results.  Please continue with current treatment plan.    Resulted Orders   Lipid panel reflex to direct LDL Fasting   Result Value Ref Range    Cholesterol 157 <200 mg/dL    Triglycerides 56 <150 mg/dL    Direct Measure HDL 48 >=40 mg/dL    LDL Cholesterol Calculated 98 <=100 mg/dL    Non HDL Cholesterol 109 <130 mg/dL    Narrative    Cholesterol  Desirable:  <200 mg/dL    Triglycerides  Normal:  Less than 150 mg/dL  Borderline High:  150-199 mg/dL  High:  200-499 mg/dL  Very High:  Greater than or equal to 500 mg/dL    Direct Measure HDL  Female:  Greater than or equal to 50 mg/dL   Male:  Greater than or equal to 40 mg/dL    LDL Cholesterol  Desirable:  <100mg/dL  Above Desirable:  100-129 mg/dL   Borderline High:  130-159 mg/dL   High:  160-189 mg/dL   Very High:  >= 190 mg/dL    Non HDL Cholesterol  Desirable:  130 mg/dL  Above Desirable:  130-159 mg/dL  Borderline High:  160-189 mg/dL  High:  190-219 mg/dL  Very High:  Greater than or equal to 220 mg/dL   PSA, screen   Result Value Ref Range    Prostate Specific Antigen Screen 1.34 0.00 - 4.50 ng/mL    Narrative    This result is obtained using the Roche Elecsys total PSA method on the leif e801 immunoassay analyzer. Results obtained with different assay methods or kits cannot be used interchangeably.   Glucose   Result Value Ref Range    Glucose 102 (H) 70 - 99 mg/dL    Patient Fasting > 8hrs? Yes        If you have any questions or concerns, please call the clinic at the number listed above.       Sincerely,      Will Morocho MD

## 2022-08-25 NOTE — PROGRESS NOTES
"SUBJECTIVE:   CC: Hubert Saenz is an 67 year old male who presents for preventative health visit.     Patient continues to work full-time.  His wife and family are in good health.  He has had problems for the past few years falling asleep and staying asleep.  No snoring issues.  He is tried multiple over-the-counter products  Patient has been advised of split billing requirements and indicates understanding: Yes     Healthy Habits:     In general, how would you rate your overall health?  Good    Frequency of exercise:  None    Do you usually eat at least 4 servings of fruit and vegetables a day, include whole grains    & fiber and avoid regularly eating high fat or \"junk\" foods?  No    Taking medications regularly:  Yes    Medication side effects:  None    Ability to successfully perform activities of daily living:  No assistance needed    Home Safety:  No safety concerns identified    Hearing Impairment:  No hearing concerns    In the past 6 months, have you been bothered by leaking of urine?  No    In general, how would you rate your overall mental or emotional health?  Excellent      PHQ-2 Total Score: 0          Discuss issues with sleeping.  He can fall asleep but then awakes during the night.  Onset over the past couple of years.  Has been trying to go to bed later at night.    Today's PHQ-2 Score:   PHQ-2 ( 1999 [employer]) 8/25/2022   Q1: Little interest or pleasure in doing things 0   Q2: Feeling down, depressed or hopeless 0   PHQ-2 Score 0   Q1: Little interest or pleasure in doing things Not at all   Q2: Feeling down, depressed or hopeless Not at all   PHQ-2 Score 0       Abuse: Current or Past(Physical, Sexual or Emotional)- No  Do you feel safe in your environment? Yes    Have you ever done Advance Care Planning? (For example, a Health Directive, POLST, or a discussion with a medical provider or your loved ones about your wishes): Yes, patient states has an Advance Care Planning document and will bring " a copy to the clinic.    Social History     Tobacco Use     Smoking status: Former Smoker     Smokeless tobacco: Never Used   Substance Use Topics     Alcohol use: Not on file     If you drink alcohol do you typically have >3 drinks per day or >7 drinks per week? No    Alcohol Use 8/25/2022   Prescreen: >3 drinks/day or >7 drinks/week? No   No flowsheet data found.    Last PSA:   Prostate Specific Antigen Screen   Date Value Ref Range Status   06/17/2021 1.2 < OR = 4.0 ng/mL Final     Comment:     The total PSA value from this assay system is   standardized against the WHO standard. The test   result will be approximately 20% lower when compared   to the equimolar-standardized total PSA (Stephen   [place]). Comparison of serial PSA results should be   interpreted with this fact in mind.     This test was performed using the Siemens   chemiluminescent method. Values obtained from   different assay methods cannot be used  interchangeably. PSA levels, regardless of  value, should not be interpreted as absolute  evidence of the presence or absence of disease.  Lab test performed by:  [facility]  [address] [place] [address]  John Marr M.D.  QUEST Specimen received date and time: 18-JUN-2021 07:09:00.00         Reviewed orders with patient. Reviewed health maintenance and updated orders accordingly - Yes      Reviewed and updated as needed this visit by clinical staff                    Reviewed and updated as needed this visit by Provider                   Patient Active Problem List   Diagnosis     Adenomatous polyp of colon     Hearing loss     Hyperlipidemia     Mild intermittent asthma     Obesity     Osteoarthritis     Seasonal allergic rhinitis     Squamous cell carcinoma in situ (SCCIS)     Current Outpatient Medications   Medication     atorvastatin (LIPITOR) 40 MG tablet     cetirizine (ZYRTEC) 10 MG tablet     traZODone (DESYREL) 50 MG tablet      "albuterol (PROAIR HFA/PROVENTIL HFA/VENTOLIN HFA) 108 (90 Base) MCG/ACT inhaler     benzonatate (TESSALON) 200 MG capsule     No current facility-administered medications for this visit.     Past Surgical History:   Procedure Laterality Date     APPENDECTOMY      no date     ARTHROSCOPY ANKLE      no date     BIOPSY OF SKIN LESION  01/05/2017    Left distal calf, right knee, right superior vale of antihelix, right lateral temple.     COLONOSCOPY      12/2009     COLONOSCOPY  03/25/2015    Single divertiulum, Small tubular adenoma 50 cm   Repeat in 5 years     COLONOSCOPY  03/05/2020    Impression: One 2 mm polyp in cecum.  One 3 mm polyp in transverse colon.  One 3 mm polyp in descending colon.  Diverticulosis in sigmoid colon.  Perianal skin tags.  -Results:  tubular adenoma x3, negative for dysplasia.  Recommendation:  f/u 5yrs     CT CALCIUM SCREENING  03/16/2020    Total Agatston calcium score is 20.           Review of Systems   Constitutional: Negative for chills and fever.   HENT: Positive for hearing loss. Negative for congestion, ear pain and sore throat.    Eyes: Negative for pain and visual disturbance.   Respiratory: Negative for cough and shortness of breath.    Cardiovascular: Negative for chest pain, palpitations and peripheral edema.   Gastrointestinal: Negative for abdominal pain, constipation, diarrhea, heartburn, hematochezia and nausea.   Genitourinary: Negative for dysuria, frequency, genital sores, hematuria, impotence, penile discharge and urgency.   Musculoskeletal: Negative for arthralgias, joint swelling and myalgias.   Skin: Negative for rash.   Neurological: Negative for dizziness, weakness, headaches and paresthesias.   Psychiatric/Behavioral: Negative for mood changes. The patient is not nervous/anxious.          OBJECTIVE:   /75 (BP Location: Right arm, Patient Position: Sitting, Cuff Size: Adult Large)   Pulse 67   Temp 97.5  F (36.4  C) (Tympanic)   Ht 1.753 m (5' 9\")   Wt " "82.4 kg (181 lb 9.6 oz)   BMI 26.82 kg/m      Physical Exam  GENERAL: healthy, alert and no distress  EYES: Eyes grossly normal to inspection, PERRL and conjunctivae and sclerae normal he has hearing aids  HENT: ear canals and TM's normal, nose and mouth without ulcers or lesions  NECK: no adenopathy, no asymmetry, masses, or scars and thyroid normal to palpation  RESP: lungs clear to auscultation - no rales, rhonchi or wheezes  CV: regular rate and rhythm, normal S1 S2, no S3 or S4, no murmur, click or rub, no peripheral edema and peripheral pulses strong  ABDOMEN: soft, nontender, no hepatosplenomegaly, no masses and bowel sounds normal  MS: no gross musculoskeletal defects noted, no edema  SKIN: no suspicious lesions or rashes  NEURO: Normal strength and tone, mentation intact and speech normal  PSYCH: mentation appears normal, affect normal/bright    Diagnostic Test Results:  Labs reviewed in Epic    ASSESSMENT/PLAN:   (Z00.00) Well adult exam  (primary encounter diagnosis)  Comment: Healthcare maintenance reviewed  Plan:     (E78.5) Hyperlipidemia, unspecified hyperlipidemia type  Comment: Well-controlled  Plan: Lipid panel reflex to direct LDL Fasting            (F51.01) Primary insomnia  Comment: Trial trazodone warned him of side effects  Plan: traZODone (DESYREL) 50 MG tablet            (D12.6) Adenomatous polyp of colon, unspecified part of colon  Comment: Up-to-date on colonoscopies  Plan:     (Z13.1) Screening for diabetes mellitus  Comment:   Plan: Glucose            (Z12.5) Screening for prostate cancer  Comment:   Plan: PSA, screen                    COUNSELING:   Reviewed preventive health counseling, as reflected in patient instructions    Estimated body mass index is 28.65 kg/m  as calculated from the following:    Height as of 6/15/22: 1.753 m (5' 9\").    Weight as of 6/15/22: 88 kg (194 lb).         He reports that he has quit smoking. He has never used smokeless tobacco.      Counseling " Resources:  ATP IV Guidelines  Pooled Cohorts Equation Calculator  FRAX Risk Assessment  ICSI Preventive Guidelines  Dietary Guidelines for Americans, 2010  USDA's MyPlate  ASA Prophylaxis  Lung CA Screening    Will Morocho MD  [facility]

## 2022-08-26 RX ORDER — ATORVASTATIN CALCIUM 40 MG/1
40 TABLET, FILM COATED ORAL DAILY
Qty: 90 TABLET | Refills: 3 | Status: SHIPPED | OUTPATIENT
Start: 2022-08-26 | End: 2023-10-18

## 2022-09-28 DIAGNOSIS — J30.1 SEASONAL ALLERGIC RHINITIS DUE TO POLLEN: Primary | ICD-10-CM

## 2022-09-28 RX ORDER — CETIRIZINE HYDROCHLORIDE 10 MG/1
TABLET ORAL
Qty: 90 TABLET | Refills: 3 | Status: SHIPPED | OUTPATIENT
Start: 2022-09-28 | End: 2023-10-24

## 2022-09-28 NOTE — TELEPHONE ENCOUNTER
Last Written Prescription Date:  6/17/21?  Last Fill Quantity: ?,  # refills: ?   Last office visit: 8/25/2022 with prescribing provider:  [facility] adult   Future Office Visit:        Routing refill request to provider for review/approval because:      Danae Ferguson RN

## 2022-09-30 RX ORDER — SIMVASTATIN 40 MG
40 TABLET ORAL AT BEDTIME
Qty: 90 TABLET | Refills: 0 | OUTPATIENT
Start: 2022-09-30

## 2022-09-30 NOTE — TELEPHONE ENCOUNTER
Patient takes Atorvastatin 40 mg, no documentation that patient is taking simvastatin per 8/26/2022 office visit note.

## 2022-10-03 ENCOUNTER — HEALTH MAINTENANCE LETTER (OUTPATIENT)
Age: 67
End: 2022-10-03

## 2022-11-19 ENCOUNTER — OFFICE VISIT (OUTPATIENT)
Dept: URGENT CARE | Facility: URGENT CARE | Age: 67
End: 2022-11-19
Payer: COMMERCIAL

## 2022-11-19 VITALS
TEMPERATURE: 97 F | OXYGEN SATURATION: 97 % | SYSTOLIC BLOOD PRESSURE: 124 MMHG | WEIGHT: 188.4 LBS | DIASTOLIC BLOOD PRESSURE: 76 MMHG | BODY MASS INDEX: 27.82 KG/M2 | HEART RATE: 70 BPM

## 2022-11-19 DIAGNOSIS — J40 BRONCHITIS: Primary | ICD-10-CM

## 2022-11-19 PROCEDURE — 99213 OFFICE O/P EST LOW 20 MIN: CPT | Performed by: PHYSICIAN ASSISTANT

## 2022-11-19 RX ORDER — BENZONATATE 200 MG/1
200 CAPSULE ORAL 3 TIMES DAILY PRN
Qty: 30 CAPSULE | Refills: 1 | Status: SHIPPED | OUTPATIENT
Start: 2022-11-19 | End: 2023-09-26

## 2022-11-19 RX ORDER — AZITHROMYCIN 250 MG/1
TABLET, FILM COATED ORAL
Qty: 6 TABLET | Refills: 0 | Status: SHIPPED | OUTPATIENT
Start: 2022-11-19 | End: 2022-11-24

## 2022-11-19 NOTE — PROGRESS NOTES
Assessment & Plan     Bronchitis  Given duration of sx, given zithromax as ordered.  Tessalon for cough.  Encouraged pt to use his albuterol.    - azithromycin (ZITHROMAX) 250 MG tablet  Dispense: 6 tablet; Refill: 0  - benzonatate (TESSALON) 200 MG capsule  Dispense: 30 capsule; Refill: 1     TAYLOR Aguilar [facility] CARE [place] QUIRINO Gaspar is a 67 year old male who presents to clinic today for the following health issues:  Chief Complaint   Patient presents with     Cough     With phlegm     HPI    11+ days, cough, sputum production.  Not using albuterol, never filled. Remote hx of Reactive airway.  Chills, no fevers.  No longer chills.  No sob.    No chest pain.    Feels run down.  Negative covid 19 test at home.    Some rhionrrhea, congestion, but more at onset, that is improving.    Had covid 19 5 months ago.  Milder sx, tx with paxlovid.       Review of Systems  Constitutional, HEENT, cardiovascular, pulmonary, gi and gu systems are negative, except as otherwise noted.      Objective    /76 (BP Location: Right arm)   Pulse 70   Temp 97  F (36.1  C) (Tympanic)   Wt 85.5 kg (188 lb 6.4 oz)   SpO2 97%   BMI 27.82 kg/m    Physical Exam   Pt is in no acute distress and appears well  Ears patent B:  TM s intact, non-injected. All land marks easily visibile    Nasal mucosa is non-edematous, no discharge.    Pharynx: non erythematous, tonsils non hypertrophied, No exudate   Neck supple: no adenopathy  Lungs: CTA  Heart: RRR, no murmur, no thrills or heaves   Ext: no edema  Skin: no rashes    No results found for any visits on 11/19/22.

## 2023-08-08 DIAGNOSIS — F51.01 PRIMARY INSOMNIA: ICD-10-CM

## 2023-08-08 RX ORDER — TRAZODONE HYDROCHLORIDE 50 MG/1
TABLET, FILM COATED ORAL
Qty: 90 TABLET | Refills: 0 | Status: SHIPPED | OUTPATIENT
Start: 2023-08-08 | End: 2023-10-18

## 2023-08-08 NOTE — TELEPHONE ENCOUNTER
"Last Written Prescription Date:  8/25/22  Last Fill Quantity: 30,  # refills: 11   Last office visit provider:  8/25/22     Requested Prescriptions   Pending Prescriptions Disp Refills    traZODone (DESYREL) 50 MG tablet [Pharmacy Med Name: TRAZODONE HCL 50MG TABS] 30 tablet 11     Sig: TAKE ONE TABLET BY MOUTH ONCE EVERY DAY AT BEDTIME       Serotonin Modulators Passed - 8/8/2023  6:43 AM        Passed - Recent (12 mo) or future (30 days) visit within the authorizing provider's specialty     Patient has had an office visit with the authorizing provider or a provider within the authorizing providers department within the previous 12 mos or has a future within next 30 days. See \"Patient Info\" tab in inbasket, or \"Choose Columns\" in Meds & Orders section of the refill encounter.              Passed - Medication is active on med list        Passed - Patient is age 18 or older             AUDRA GUADALUPE RN 08/08/23 10:21 AM  "

## 2023-09-26 ENCOUNTER — OFFICE VISIT (OUTPATIENT)
Dept: FAMILY MEDICINE | Facility: CLINIC | Age: 68
End: 2023-09-26
Payer: COMMERCIAL

## 2023-09-26 VITALS
RESPIRATION RATE: 20 BRPM | WEIGHT: 195 LBS | HEIGHT: 69 IN | TEMPERATURE: 98.1 F | SYSTOLIC BLOOD PRESSURE: 124 MMHG | HEART RATE: 72 BPM | OXYGEN SATURATION: 95 % | DIASTOLIC BLOOD PRESSURE: 69 MMHG | BODY MASS INDEX: 28.88 KG/M2

## 2023-09-26 DIAGNOSIS — J40 BRONCHITIS: Primary | ICD-10-CM

## 2023-09-26 PROCEDURE — 99213 OFFICE O/P EST LOW 20 MIN: CPT | Performed by: FAMILY MEDICINE

## 2023-09-26 RX ORDER — PREDNISONE 10 MG/1
TABLET ORAL
Qty: 30 TABLET | Refills: 0 | Status: SHIPPED | OUTPATIENT
Start: 2023-09-26 | End: 2023-10-24

## 2023-09-26 RX ORDER — BENZONATATE 200 MG/1
200 CAPSULE ORAL 3 TIMES DAILY PRN
Qty: 30 CAPSULE | Refills: 1 | Status: SHIPPED | OUTPATIENT
Start: 2023-09-26 | End: 2023-12-20

## 2023-09-26 RX ORDER — ALBUTEROL SULFATE 90 UG/1
2 AEROSOL, METERED RESPIRATORY (INHALATION) EVERY 6 HOURS
Qty: 18 G | Refills: 1 | Status: SHIPPED | OUTPATIENT
Start: 2023-09-26 | End: 2024-07-30

## 2023-09-26 RX ORDER — AZITHROMYCIN 250 MG/1
TABLET, FILM COATED ORAL
Qty: 6 TABLET | Refills: 0 | Status: SHIPPED | OUTPATIENT
Start: 2023-09-26 | End: 2023-10-01

## 2023-09-26 ASSESSMENT — ENCOUNTER SYMPTOMS
COUGH: 1
SHORTNESS OF BREATH: 1
WHEEZING: 1

## 2023-09-26 ASSESSMENT — ASTHMA QUESTIONNAIRES: ACT_TOTALSCORE: 14

## 2023-09-26 ASSESSMENT — PAIN SCALES - GENERAL: PAINLEVEL: NO PAIN (0)

## 2023-09-26 ASSESSMENT — LIFESTYLE VARIABLES: SMOKING_STATUS: 0

## 2023-09-26 NOTE — PROGRESS NOTES
"  Assessment & Plan     Bronchitis  3-week history of significant cough and congestion.  Will cover with prednisone taper given his history as well as Zithromax renewed his albuterol follow-up in a week if not better sooner if worse  - predniSONE (DELTASONE) 10 MG tablet; Take 4 tabs a day for 3 days and taper by 1 tab every 3 days  - albuterol (PROAIR HFA/PROVENTIL HFA/VENTOLIN HFA) 108 (90 Base) MCG/ACT inhaler; Inhale 2 puffs into the lungs every 6 hours  - benzonatate (TESSALON) 200 MG capsule; Take 1 capsule (200 mg) by mouth 3 times daily as needed for cough  - azithromycin (ZITHROMAX) 250 MG tablet; Take 2 tablets (500 mg) by mouth daily for 1 day, THEN 1 tablet (250 mg) daily for 4 days.             BMI:   Estimated body mass index is 28.8 kg/m  as calculated from the following:    Height as of this encounter: 1.753 m (5' 9\").    Weight as of this encounter: 88.5 kg (195 lb).           Will Morocho MD  [facility]    Silvia Gaspar is a 68 year old, presenting for the following health issues: Patient had a cough for 3 weeks.  Started as a cold with head congestion and sore throat.  Negative COVID test several times.  No fevers chills or sweats bringing up some phlegm.  No lightheadedness dizziness no chest pain  Cough (SOB and wheezing x3 weeks.)      9/26/2023    10:13 AM   Additional Questions   Roomed by SRud   Accompanied by n/a       Cough  This is a new problem. The current episode started more than 1 week ago. The problem occurs hourly. The problem has been gradually worsening. The cough is Productive of sputum. There has been no fever. Associated symptoms include shortness of breath and wheezing. He is not a smoker.   History of Present Illness       Reason for visit:  Cough  Symptom onset:  3-4 weeks ago  Symptoms include:  Cough chest congestion  Symptom intensity:  Moderate  Symptom progression:  Staying the same  Had these symptoms before:  Yes  Has " "tried/received treatment for these symptoms:  Yes  Previous treatment was successful:  Yes  Prior treatment description:  ?  What makes it worse:  ?  What makes it better:  Sleep    He eats 2-3 servings of fruits and vegetables daily.He consumes 1 sweetened beverage(s) daily.He exercises with enough effort to increase his heart rate 10 to 19 minutes per day.  He exercises with enough effort to increase his heart rate 3 or less days per week.   He is taking medications regularly.                 Review of Systems   Respiratory:  Positive for cough, shortness of breath and wheezing.       Constitutional, HEENT, cardiovascular, pulmonary, gi and gu systems are negative, except as otherwise noted.      Objective    /69 (BP Location: Right arm, Patient Position: Sitting)   Pulse 72   Temp 98.1  F (36.7  C) (Tympanic)   Resp 20   Ht 1.753 m (5' 9\")   Wt 88.5 kg (195 lb)   SpO2 95%   BMI 28.80 kg/m    Body mass index is 28.8 kg/m .  Physical Exam   Patient alert no apparent distress HEENT HEENT was normal neck supple lungs clear heart regular rhythm                      "

## 2023-10-22 ENCOUNTER — HEALTH MAINTENANCE LETTER (OUTPATIENT)
Age: 68
End: 2023-10-22

## 2023-10-22 PROBLEM — Z80.0 FAMILY HISTORY OF COLON CANCER: Status: ACTIVE | Noted: 2023-10-22

## 2023-10-22 PROBLEM — Z80.42 FAMILY HISTORY OF PROSTATE CANCER: Status: ACTIVE | Noted: 2023-10-22

## 2023-10-23 ASSESSMENT — ENCOUNTER SYMPTOMS
FREQUENCY: 0
FEVER: 0
CHILLS: 0
HEMATURIA: 0
HEMATOCHEZIA: 0
SHORTNESS OF BREATH: 0
COUGH: 0
PALPITATIONS: 0
CONSTIPATION: 0
NAUSEA: 0
EYE PAIN: 0
WEAKNESS: 0
DIARRHEA: 0
PARESTHESIAS: 0
HEADACHES: 0
NERVOUS/ANXIOUS: 0
MYALGIAS: 0
ARTHRALGIAS: 1
JOINT SWELLING: 0
SORE THROAT: 0
ABDOMINAL PAIN: 0
HEARTBURN: 0
DIZZINESS: 0
DYSURIA: 0

## 2023-10-23 ASSESSMENT — ACTIVITIES OF DAILY LIVING (ADL): CURRENT_FUNCTION: NO ASSISTANCE NEEDED

## 2023-10-24 ENCOUNTER — OFFICE VISIT (OUTPATIENT)
Dept: FAMILY MEDICINE | Facility: CLINIC | Age: 68
End: 2023-10-24
Payer: COMMERCIAL

## 2023-10-24 VITALS
WEIGHT: 190 LBS | BODY MASS INDEX: 29.82 KG/M2 | SYSTOLIC BLOOD PRESSURE: 106 MMHG | HEART RATE: 73 BPM | TEMPERATURE: 98 F | DIASTOLIC BLOOD PRESSURE: 72 MMHG | OXYGEN SATURATION: 97 % | HEIGHT: 67 IN | RESPIRATION RATE: 16 BRPM

## 2023-10-24 DIAGNOSIS — Z80.42 FAMILY HISTORY OF PROSTATE CANCER: ICD-10-CM

## 2023-10-24 DIAGNOSIS — H91.90 HEARING LOSS, UNSPECIFIED HEARING LOSS TYPE, UNSPECIFIED LATERALITY: ICD-10-CM

## 2023-10-24 DIAGNOSIS — Z13.6 SCREENING FOR AAA (ABDOMINAL AORTIC ANEURYSM): ICD-10-CM

## 2023-10-24 DIAGNOSIS — D12.6 ADENOMATOUS POLYP OF COLON, UNSPECIFIED PART OF COLON: ICD-10-CM

## 2023-10-24 DIAGNOSIS — Z80.0 FAMILY HISTORY OF COLON CANCER: ICD-10-CM

## 2023-10-24 DIAGNOSIS — Z00.00 ENCOUNTER FOR MEDICARE ANNUAL WELLNESS EXAM: Primary | ICD-10-CM

## 2023-10-24 DIAGNOSIS — J30.1 SEASONAL ALLERGIC RHINITIS DUE TO POLLEN: ICD-10-CM

## 2023-10-24 DIAGNOSIS — Z13.1 SCREENING FOR DIABETES MELLITUS: ICD-10-CM

## 2023-10-24 DIAGNOSIS — E78.5 HYPERLIPIDEMIA, UNSPECIFIED HYPERLIPIDEMIA TYPE: ICD-10-CM

## 2023-10-24 DIAGNOSIS — F51.01 PRIMARY INSOMNIA: ICD-10-CM

## 2023-10-24 DIAGNOSIS — Z12.5 SCREENING FOR PROSTATE CANCER: ICD-10-CM

## 2023-10-24 LAB
CHOLEST SERPL-MCNC: 156 MG/DL
FASTING STATUS PATIENT QL REPORTED: NO
GLUCOSE SERPL-MCNC: 105 MG/DL (ref 70–99)
HDLC SERPL-MCNC: 45 MG/DL
LDLC SERPL CALC-MCNC: 96 MG/DL
NONHDLC SERPL-MCNC: 111 MG/DL
PSA SERPL DL<=0.01 NG/ML-MCNC: 1.8 NG/ML (ref 0–6.5)
TRIGL SERPL-MCNC: 76 MG/DL

## 2023-10-24 PROCEDURE — 82947 ASSAY GLUCOSE BLOOD QUANT: CPT | Mod: QW | Performed by: FAMILY MEDICINE

## 2023-10-24 PROCEDURE — 99397 PER PM REEVAL EST PAT 65+ YR: CPT | Mod: 25 | Performed by: FAMILY MEDICINE

## 2023-10-24 PROCEDURE — 80061 LIPID PANEL: CPT | Performed by: FAMILY MEDICINE

## 2023-10-24 PROCEDURE — 36415 COLL VENOUS BLD VENIPUNCTURE: CPT | Performed by: FAMILY MEDICINE

## 2023-10-24 PROCEDURE — G0103 PSA SCREENING: HCPCS | Performed by: FAMILY MEDICINE

## 2023-10-24 PROCEDURE — 90471 IMMUNIZATION ADMIN: CPT | Performed by: FAMILY MEDICINE

## 2023-10-24 PROCEDURE — 99214 OFFICE O/P EST MOD 30 MIN: CPT | Mod: 25 | Performed by: FAMILY MEDICINE

## 2023-10-24 PROCEDURE — 90662 IIV NO PRSV INCREASED AG IM: CPT | Performed by: FAMILY MEDICINE

## 2023-10-24 PROCEDURE — 90480 ADMN SARSCOV2 VAC 1/ONLY CMP: CPT | Performed by: FAMILY MEDICINE

## 2023-10-24 PROCEDURE — 91320 SARSCV2 VAC 30MCG TRS-SUC IM: CPT | Performed by: FAMILY MEDICINE

## 2023-10-24 RX ORDER — ATORVASTATIN CALCIUM 40 MG/1
40 TABLET, FILM COATED ORAL DAILY
Qty: 90 TABLET | Refills: 3 | Status: SHIPPED | OUTPATIENT
Start: 2023-10-24 | End: 2024-07-30

## 2023-10-24 RX ORDER — CETIRIZINE HYDROCHLORIDE 10 MG/1
10 TABLET ORAL DAILY
Qty: 90 TABLET | Refills: 3 | Status: SHIPPED | OUTPATIENT
Start: 2023-10-24 | End: 2024-07-30

## 2023-10-24 RX ORDER — TRAZODONE HYDROCHLORIDE 50 MG/1
50 TABLET, FILM COATED ORAL AT BEDTIME
Qty: 90 TABLET | Refills: 3 | Status: SHIPPED | OUTPATIENT
Start: 2023-10-24 | End: 2024-07-30

## 2023-10-24 ASSESSMENT — ACTIVITIES OF DAILY LIVING (ADL): CURRENT_FUNCTION: NO ASSISTANCE NEEDED

## 2023-10-24 ASSESSMENT — ENCOUNTER SYMPTOMS
DIZZINESS: 0
HEMATURIA: 0
MYALGIAS: 0
WEAKNESS: 0
ABDOMINAL PAIN: 0
JOINT SWELLING: 0
NAUSEA: 0
HEMATOCHEZIA: 0
DIARRHEA: 0
PARESTHESIAS: 0
HEARTBURN: 0
NERVOUS/ANXIOUS: 0
ARTHRALGIAS: 1
FEVER: 0
CHILLS: 0
EYE PAIN: 0
FREQUENCY: 0
SORE THROAT: 0
PALPITATIONS: 0
HEADACHES: 0
DYSURIA: 0
COUGH: 0
CONSTIPATION: 0
SHORTNESS OF BREATH: 0

## 2023-10-24 NOTE — PROGRESS NOTES
"The patient was counseled and encouraged to consider modifying their diet and eating habits. He was provided with information on recommended healthy diet options.  Answers submitted by the patient for this visit:  Annual Preventive Visit (Submitted on 10/23/2023)  Chief Complaint: Annual Exam:  In general, how would you rate your overall physical health?: good  Frequency of exercise:: 2-3 days/week  Do you usually eat at least 4 servings of fruit and vegetables a day, include whole grains & fiber, and avoid regularly eating high fat or \"junk\" foods? : No  Taking medications regularly:: Yes  Medication side effects:: None  Activities of Daily Living: no assistance needed  Home safety: no safety concerns identified  Hearing Impairment:: no hearing concerns  In the past 6 months, have you been bothered by leaking of urine?: No  abdominal pain: No  Blood in stool: No  Blood in urine: No  chest pain: No  chills: No  congestion: Yes  constipation: No  cough: No  diarrhea: No  dizziness: No  ear pain: No  eye pain: No  nervous/anxious: No  fever: No  frequency: No  genital sores: No  headaches: No  hearing loss: No  heartburn: No  arthralgias: Yes  joint swelling: No  peripheral edema: No  mood changes: No  myalgias: No  nausea: No  dysuria: No  palpitations: No  Skin sensation changes: No  sore throat: No  urgency: No  rash: No  shortness of breath: No  visual disturbance: No  weakness: No  impotence: No  penile discharge: No  In general, how would you rate your overall mental or emotional health?: good  Exercise outside of work (Submitted on 10/23/2023)  Chief Complaint: Annual Exam:  Duration of exercise:: 15-30 minutes    "

## 2023-10-24 NOTE — PATIENT INSTRUCTIONS
Patient Education   Personalized Prevention Plan  You are due for the preventive services outlined below.  Your care team is available to assist you in scheduling these services.  If you have already completed any of these items, please share that information with your care team to update in your medical record.  Health Maintenance Due   Topic Date Due     Asthma Action Plan - yearly  Never done     LUNG CANCER SCREENING  Never done     RSV VACCINE 60+ (1 - 1-dose 60+ series) Never done     AORTIC ANEURYSM SCREENING (SYSTEM ASSIGNED)  Never done     Diptheria Tetanus Pertussis (DTAP/TDAP/TD) Vaccine (5 - Td or Tdap) 07/26/2023     Annual Wellness Visit  08/25/2023     Flu Vaccine (1) 09/01/2023     COVID-19 Vaccine (5 - 2023-24 season) 09/01/2023     Learning About Dietary Guidelines  What are the Dietary Guidelines for Americans?     Dietary Guidelines for Americans provide tips for eating well and staying healthy. This helps reduce the risk for long-term (chronic) diseases.  These guidelines recommend that you:  Eat and drink the right amount for you. The U.S. government's food guide is called MyPlate. It can help you make your own well-balanced eating plan.  Try to balance your eating with your activity. This helps you stay at a healthy weight.  Drink alcohol in moderation, if at all.  Limit foods high in salt, saturated fat, trans fat, and added sugar.  These guidelines are from the U.S. Department of Agriculture and the U.S. Department of Health and Human Services. They are updated every 5 years.  What is MyPlate?  MyPlate is the U.S. government's food guide. It can help you make your own well-balanced eating plan. A balanced eating plan means that you eat enough, but not too much, and that your food gives you the nutrients you need to stay healthy.  MyPlate focuses on eating plenty of whole grains, fruits, and vegetables, and on limiting fat and sugar. It is available online at www.ChooseMyPlate.gov.  How can  "you get started?  If you're trying to eat healthier, you can slowly change your eating habits over time. You don't have to make big changes all at once. Start by adding one or two healthy foods to your meals each day.  Grains  Choose whole-grain breads and cereals and whole-wheat pasta and whole-grain crackers.  Vegetables  Eat a variety of vegetables every day. They have lots of nutrients and are part of a heart-healthy diet.  Fruits  Eat a variety of fruits every day. Fruits contain lots of nutrients. Choose fresh fruit instead of fruit juice.  Protein foods  Choose fish and lean poultry more often. Eat red meat and fried meats less often. Dried beans, tofu, and nuts are also good sources of protein.  Dairy  Choose low-fat or fat-free products from this food group. If you have problems digesting milk, try eating cheese or yogurt instead.  Fats and oils  Limit fats and oils if you're trying to cut calories. Choose healthy fats when you cook. These include canola oil and olive oil.  Where can you learn more?  Go to https://www.[employer] and [employer].net/patiented  Enter D676 in the search box to learn more about \"Learning About Dietary Guidelines.\"  Current as of: March 1, 2023               Content Version: 13.7    8432-7328 Your Image by [employer].   Care instructions adapted under license by your healthcare professional. If you have questions about a medical condition or this instruction, always ask your healthcare professional. [employer] disclaims any warranty or liability for your use of this information.         "

## 2023-10-24 NOTE — PROGRESS NOTES
"SUBJECTIVE:   Hubert is a 68 year old who presents for Preventive Visit.  Overall doing well.  Continues to live independently with his wife.  Still working.  He sets his own hours.  He is still an avid [place].      10/24/2023    10:08 AM   Additional Questions   Roomed by Betsy       Are you in the first 12 months of your Medicare coverage?  Not on medicare    Healthy Habits:     In general, how would you rate your overall health?  Good    Frequency of exercise:  2-3 days/week    Duration of exercise:  15-30 minutes    Do you usually eat at least 4 servings of fruit and vegetables a day, include whole grains    & fiber and avoid regularly eating high fat or \"junk\" foods?  No    Taking medications regularly:  Yes    Medication side effects:  None    Ability to successfully perform activities of daily living:  No assistance needed    Home Safety:  No safety concerns identified    Hearing Impairment:  No hearing concerns    In the past 6 months, have you been bothered by leaking of urine?  No    In general, how would you rate your overall mental or emotional health?  Good          Have you ever done Advance Care Planning? (For example, a Health Directive, POLST, or a discussion with a medical provider or your loved ones about your wishes): Yes, patient states has an Advance Care Planning document and will bring a copy to the clinic.     Pt declines hearing screen - has hearing aides.    Fall risk  Fallen 2 or more times in the past year?: No  Any fall with injury in the past year?: No    Cognitive Screening   1) Repeat 3 items (Leader, Season, Table)    2) Clock draw: NORMAL  3) 3 item recall: Recalls 3 objects  Results: 3 items recalled: COGNITIVE IMPAIRMENT LESS LIKELY    Mini-CogTM Copyright PAULETTE Martin. Licensed by the author for use in [facility]; reprinted with permission (lowell@.[facility]). All rights reserved.          Reviewed and updated as needed this visit by " clinical staff   Tobacco  Allergies  Meds              Reviewed and updated as needed this visit by Provider                 Social History     Tobacco Use    Smoking status: Former     Years: 3     Types: Cigarettes     Quit date: 1974     Years since quittin.8     Passive exposure: Past    Smokeless tobacco: Never   Substance Use Topics    Alcohol use: Yes             10/23/2023    12:03 PM   Alcohol Use   Prescreen: >3 drinks/day or >7 drinks/week? No     Do you have a current opioid prescription? No  Do you use any other controlled substances or medications that are not prescribed by a provider? None        Current providers sharing in care for this patient include:   Patient Care Team:  Will Morocho MD as PCP - General (Family Medicine)  Will Morocho MD (Family Practice)  Will Morocho MD as Assigned PCP    The following health maintenance items are reviewed in Epic and correct as of today:  Health Maintenance   Topic Date Due    ASTHMA ACTION PLAN  Never done    LUNG CANCER SCREENING  Never done    RSV VACCINE 60+ (1 - 1-dose 60+ series) Never done    AORTIC ANEURYSM SCREENING (SYSTEM ASSIGNED)  Never done    DTAP/TDAP/TD IMMUNIZATION (5 - Td or Tdap) 2023    MEDICARE ANNUAL WELLNESS VISIT  2023    ANNUAL REVIEW OF HM ORDERS  2023    INFLUENZA VACCINE (1) 2023    COVID-19 Vaccine ( - - season) 2023    ASTHMA CONTROL TEST  2024    FALL RISK ASSESSMENT  10/24/2024    COLORECTAL CANCER SCREENING  2025    LIPID  2027    ADVANCE CARE PLANNING  2027    PHQ-2 (once per calendar year)  Completed    Pneumococcal Vaccine: 65+ Years  Completed    ZOSTER IMMUNIZATION  Completed    IPV IMMUNIZATION  Aged Out    HPV IMMUNIZATION  Aged Out    MENINGITIS IMMUNIZATION  Aged Out    HEPATITIS C SCREENING  Discontinued       Patient Active Problem List   Diagnosis    Adenomatous polyp of colon    Hearing loss    Hyperlipidemia    Mild  "intermittent asthma    Obesity    Osteoarthritis    Seasonal allergic rhinitis    Squamous cell carcinoma in situ (SCCIS)    Family history of prostate cancer    Family history of colon cancer     Current Outpatient Medications   Medication    albuterol (PROAIR HFA/PROVENTIL HFA/VENTOLIN HFA) 108 (90 Base) MCG/ACT inhaler    atorvastatin (LIPITOR) 40 MG tablet    benzonatate (TESSALON) 200 MG capsule    cetirizine (ZYRTEC) 10 MG tablet    traZODone (DESYREL) 50 MG tablet     No current facility-administered medications for this visit.             Review of Systems   Constitutional:  Negative for chills and fever.   HENT:  Positive for congestion. Negative for ear pain, hearing loss and sore throat.    Eyes:  Negative for pain and visual disturbance.   Respiratory:  Negative for cough and shortness of breath.    Cardiovascular:  Negative for chest pain, palpitations and peripheral edema.   Gastrointestinal:  Negative for abdominal pain, constipation, diarrhea, heartburn, hematochezia and nausea.   Genitourinary:  Negative for dysuria, frequency, genital sores, hematuria, impotence, penile discharge and urgency.   Musculoskeletal:  Positive for arthralgias. Negative for joint swelling and myalgias.   Skin:  Negative for rash.   Neurological:  Negative for dizziness, weakness, headaches and paresthesias.   Psychiatric/Behavioral:  Negative for mood changes. The patient is not nervous/anxious.          OBJECTIVE:   /72 (BP Location: Right arm, Patient Position: Sitting, Cuff Size: Adult Large)   Pulse 73   Temp 98  F (36.7  C) (Temporal)   Resp 16   Ht 1.702 m (5' 7\")   Wt 86.2 kg (190 lb)   SpO2 97%   BMI 29.76 kg/m   Estimated body mass index is 29.76 kg/m  as calculated from the following:    Height as of this encounter: 1.702 m (5' 7\").    Weight as of this encounter: 86.2 kg (190 lb).  Physical Exam  GENERAL: healthy, alert and no distress  NECK: no adenopathy, no asymmetry, masses, or scars and " "thyroid normal to palpation  RESP: lungs clear to auscultation - no rales, rhonchi or wheezes  CV: regular rate and rhythm, normal S1 S2, no S3 or S4, no murmur, click or rub, no peripheral edema and peripheral pulses strong  ABDOMEN: soft, nontender, no hepatosplenomegaly, no masses and bowel sounds normal  MS: no gross musculoskeletal defects noted, no edema        ASSESSMENT / PLAN:   (Z00.00) Encounter for Medicare annual wellness exam  (primary encounter diagnosis)  Comment: Healthcare maintenance advance directives reviewed  Plan:     (E78.5) Hyperlipidemia, unspecified hyperlipidemia type  Comment: With abnormal cardiac calcium score on atorvastatin well-controlled  Plan: atorvastatin (LIPITOR) 40 MG tablet, Lipid         panel reflex to direct LDL Fasting            (F51.01) Primary insomnia  Comment: Controlled with trazodone  Plan: traZODone (DESYREL) 50 MG tablet            (Z12.5) Screening for prostate cancer  Comment:   Plan: PSA, screen            (D12.6) Adenomatous polyp of colon, unspecified part of colon  Comment: Up-to-date on colonoscopy  Plan:     (H91.90) Hearing loss, unspecified hearing loss type, unspecified laterality  Comment: Has hearing aids  Plan:     (Z80.0) Family history of colon cancer  Comment: Up-to-date on colonoscopy  Plan:     (Z80.42) Family history of prostate cancer  Comment: Yearly PSA  Plan:     (Z13.6) Screening for AAA (abdominal aortic aneurysm)  Comment:   Plan: US Abdominal Aorta Imaging            (J30.1) Seasonal allergic rhinitis due to pollen  Comment: Well-controlled with Zyrtec  Plan: cetirizine (ZYRTEC) 10 MG tablet            (Z13.1) Screening for diabetes mellitus  Comment:   Plan: Glucose                      COUNSELING:  Reviewed preventive health counseling, as reflected in patient instructions      BMI:   Estimated body mass index is 29.76 kg/m  as calculated from the following:    Height as of this encounter: 1.702 m (5' 7\").    Weight as of this " encounter: 86.2 kg (190 lb).         He reports that he quit smoking about 49 years ago. His smoking use included cigarettes. He has been exposed to tobacco smoke. He has never used smokeless tobacco.      Appropriate preventive services were discussed with this patient, including applicable screening as appropriate for fall prevention, nutrition, physical activity, Tobacco-use cessation, weight loss and cognition.  Checklist reviewing preventive services available has been given to the patient.    Reviewed patients plan of care and provided an AVS. The Basic Care Plan (routine screening as documented in Health Maintenance) for Hubert meets the Care Plan requirement. This Care Plan has been established and reviewed with the Patient.          Will Morocho MD  [facility]    Identified Health Risks:

## 2023-10-24 NOTE — LETTER
October 24, 2023      Hubert Saenz  [id number] [address]        Dear ,    We are writing to inform you of your test results.    Your test results fall within the expected range(s) or remain unchanged from previous results.  Please continue with current treatment plan.    Resulted Orders   Lipid panel reflex to direct LDL Fasting   Result Value Ref Range    Cholesterol 156 <200 mg/dL    Triglycerides 76 <150 mg/dL    Direct Measure HDL 45 >=40 mg/dL    LDL Cholesterol Calculated 96 <=100 mg/dL    Non HDL Cholesterol 111 <130 mg/dL    Narrative    Cholesterol  Desirable:  <200 mg/dL    Triglycerides  Normal:  Less than 150 mg/dL  Borderline High:  150-199 mg/dL  High:  200-499 mg/dL  Very High:  Greater than or equal to 500 mg/dL    Direct Measure HDL  Female:  Greater than or equal to 50 mg/dL   Male:  Greater than or equal to 40 mg/dL    LDL Cholesterol  Desirable:  <100mg/dL  Above Desirable:  100-129 mg/dL   Borderline High:  130-159 mg/dL   High:  160-189 mg/dL   Very High:  >= 190 mg/dL    Non HDL Cholesterol  Desirable:  130 mg/dL  Above Desirable:  130-159 mg/dL  Borderline High:  160-189 mg/dL  High:  190-219 mg/dL  Very High:  Greater than or equal to 220 mg/dL   Glucose   Result Value Ref Range    Glucose 105 (H) 70 - 99 mg/dL    Patient Fasting > 8hrs? No    PSA, screen   Result Value Ref Range    Prostate Specific Antigen Screen 1.80 0.00 - 6.50 ng/mL    Narrative    This result is obtained using the Roche Elecsys total PSA method on the leif e801 immunoassay analyzer. Results obtained with different assay methods or kits cannot be used interchangeably.       If you have any questions or concerns, please call the clinic at the number listed above.       Sincerely,      Will Morocho MD

## 2023-12-19 ENCOUNTER — PATIENT OUTREACH (OUTPATIENT)
Dept: GASTROENTEROLOGY | Facility: CLINIC | Age: 68
End: 2023-12-19
Payer: COMMERCIAL

## 2023-12-20 ENCOUNTER — OFFICE VISIT (OUTPATIENT)
Dept: FAMILY MEDICINE | Facility: CLINIC | Age: 68
End: 2023-12-20
Payer: COMMERCIAL

## 2023-12-20 VITALS
TEMPERATURE: 97.4 F | RESPIRATION RATE: 16 BRPM | DIASTOLIC BLOOD PRESSURE: 77 MMHG | HEIGHT: 67 IN | OXYGEN SATURATION: 97 % | HEART RATE: 62 BPM | BODY MASS INDEX: 29.54 KG/M2 | WEIGHT: 188.2 LBS | SYSTOLIC BLOOD PRESSURE: 118 MMHG

## 2023-12-20 DIAGNOSIS — Z01.818 PREOPERATIVE EXAMINATION: Primary | ICD-10-CM

## 2023-12-20 DIAGNOSIS — M17.11 PRIMARY OSTEOARTHRITIS OF RIGHT KNEE: ICD-10-CM

## 2023-12-20 LAB
ANION GAP SERPL CALCULATED.3IONS-SCNC: 8 MMOL/L (ref 7–15)
BUN SERPL-MCNC: 25.1 MG/DL (ref 8–23)
CALCIUM SERPL-MCNC: 9.7 MG/DL (ref 8.8–10.2)
CHLORIDE SERPL-SCNC: 105 MMOL/L (ref 98–107)
CREAT SERPL-MCNC: 0.84 MG/DL (ref 0.67–1.17)
DEPRECATED HCO3 PLAS-SCNC: 27 MMOL/L (ref 22–29)
EGFRCR SERPLBLD CKD-EPI 2021: >90 ML/MIN/1.73M2
ERYTHROCYTE [DISTWIDTH] IN BLOOD BY AUTOMATED COUNT: 13.5 % (ref 10–15)
GLUCOSE SERPL-MCNC: 99 MG/DL (ref 70–99)
HCT VFR BLD AUTO: 45.8 % (ref 40–53)
HGB BLD-MCNC: 14.6 G/DL (ref 13.3–17.7)
MCH RBC QN AUTO: 28.6 PG (ref 26.5–33)
MCHC RBC AUTO-ENTMCNC: 31.9 G/DL (ref 31.5–36.5)
MCV RBC AUTO: 90 FL (ref 78–100)
PLATELET # BLD AUTO: 207 10E3/UL (ref 150–450)
POTASSIUM SERPL-SCNC: 4.5 MMOL/L (ref 3.4–5.3)
RBC # BLD AUTO: 5.11 10E6/UL (ref 4.4–5.9)
SODIUM SERPL-SCNC: 140 MMOL/L (ref 135–145)
WBC # BLD AUTO: 5.3 10E3/UL (ref 4–11)

## 2023-12-20 PROCEDURE — 93000 ELECTROCARDIOGRAM COMPLETE: CPT | Performed by: FAMILY MEDICINE

## 2023-12-20 PROCEDURE — 36415 COLL VENOUS BLD VENIPUNCTURE: CPT | Performed by: FAMILY MEDICINE

## 2023-12-20 PROCEDURE — 85027 COMPLETE CBC AUTOMATED: CPT | Performed by: FAMILY MEDICINE

## 2023-12-20 PROCEDURE — 99214 OFFICE O/P EST MOD 30 MIN: CPT | Performed by: FAMILY MEDICINE

## 2023-12-20 PROCEDURE — 80048 BASIC METABOLIC PNL TOTAL CA: CPT | Performed by: FAMILY MEDICINE

## 2023-12-20 ASSESSMENT — ASTHMA QUESTIONNAIRES: ACT_TOTALSCORE: 25

## 2023-12-20 NOTE — PROGRESS NOTES
[address] [address]  Phone: [phone]  Fax: [phone]  Primary Provider: Will Morocho  Pre-op Performing Provider: MARYJANE ESPINOZA      PREOPERATIVE EVALUATION:  Today's date: 12/20/2023    Hubert is a 68 year old, presenting for the following:  Pre-Op Exam (Surgery 01/11/2024 for right TKA at [facility] with Dr. Faye.)        12/20/2023     8:04 AM   Additional Questions   Roomed by Natali ROMANO CMA   Accompanied by Self       Surgical Information:  Surgery/Procedure: right TKA  Surgery Location: [facility]  Surgeon: Dr. Gerald Faye  Surgery Date: 01/11/2024  Time of Surgery: TBD  Where patient plans to recover: At home with family  Fax number for surgical facility: [phone]    Assessment & Plan     The proposed surgical procedure is considered INTERMEDIATE risk.    Preoperative examination      Primary osteoarthritis of right knee              - No identified additional risk factors other than previously addressed    Antiplatelet or Anticoagulation Medication Instructions:   - Patient is on no antiplatelet or anticoagulation medications.    Additional Medication Instructions:   - Statins: Continue taking on the day of surgery.    - SSRIs, SNRIs, TCAs, Antipsychotics: Continue without modification.     RECOMMENDATION:  APPROVAL GIVEN to proceed with proposed procedure, without further diagnostic evaluation.            Subjective       HPI related to upcoming procedure: left knee replacement        12/20/2023     8:01 AM   Preop Questions   1. Have you ever had a heart attack or stroke? No   2. Have you ever had surgery on your heart or blood vessels, such as a stent placement, a coronary artery bypass, or surgery on an artery in your head, neck, heart, or legs? No   3. Do you have chest pain with activity? No   4. Do you have a history of  heart failure? No   5. Do you currently have a cold, bronchitis or  symptoms of other infection? No   6. Do you have a cough, shortness of breath, or wheezing? No   7. Do you or anyone in your family have previous history of blood clots? No   8. Do you or does anyone in your family have a serious bleeding problem such as prolonged bleeding following surgeries or cuts? No   9. Have you ever had problems with anemia or been told to take iron pills? No   10. Have you had any abnormal blood loss such as black, tarry or bloody stools? No   11. Have you ever had a blood transfusion? No   12. Are you willing to have a blood transfusion if it is medically needed before, during, or after your surgery? Yes   13. Have you or any of your relatives ever had problems with anesthesia? No   14. Do you have sleep apnea, excessive snoring or daytime drowsiness? No   15. Do you have any artifical heart valves or other implanted medical devices like a pacemaker, defibrillator, or continuous glucose monitor? No   16. Do you have artificial joints? No   17. Are you allergic to latex? No       Health Care Directive:  Patient does not have a Health Care Directive or Living Will: Patient states has Advance Directive and will bring in a copy to clinic.    Preoperative Review of :   reviewed - no record of controlled substances prescribed.          Review of Systems  CONSTITUTIONAL: NEGATIVE for fever, chills, change in weight  INTEGUMENTARY/SKIN: NEGATIVE for worrisome rashes, moles or lesions  EYES: NEGATIVE for vision changes or irritation  ENT/MOUTH: NEGATIVE for ear, mouth and throat problems  RESP: NEGATIVE for significant cough or SOB  CV: NEGATIVE for chest pain, palpitations or peripheral edema  GI: NEGATIVE for nausea, abdominal pain, heartburn, or change in bowel habits  : NEGATIVE for frequency, dysuria, or hematuria  MUSCULOSKELETAL: NEGATIVE for significant arthralgias or myalgia  NEURO: NEGATIVE for weakness, dizziness or paresthesias  ENDOCRINE: NEGATIVE for temperature intolerance,  skin/hair changes  HEME: NEGATIVE for bleeding problems  PSYCHIATRIC: NEGATIVE for changes in mood or affect    Patient Active Problem List    Diagnosis Date Noted    Family history of prostate cancer 10/22/2023     Priority: Medium    Family history of colon cancer 10/22/2023     Priority: Medium    Adenomatous polyp of colon 06/15/2022     Priority: Medium    Hearing loss 06/15/2022     Priority: Medium    Hyperlipidemia 06/15/2022     Priority: Medium    Obesity 06/15/2022     Priority: Medium    Osteoarthritis 06/15/2022     Priority: Medium    Seasonal allergic rhinitis 06/15/2022     Priority: Medium    Squamous cell carcinoma in situ (SCCIS) 01/06/2017     Priority: Medium    Mild intermittent asthma 01/21/2011     Priority: Medium      No past medical history on file.  Past Surgical History:   Procedure Laterality Date    APPENDECTOMY      no date    ARTHROSCOPY ANKLE      no date    BIOPSY OF SKIN LESION  01/05/2017    Left distal calf, right knee, right superior vale of antihelix, right lateral temple.    COLONOSCOPY      12/2009    COLONOSCOPY  03/25/2015    Single divertiulum, Small tubular adenoma 50 cm   Repeat in 5 years    COLONOSCOPY  03/05/2020    Impression: One 2 mm polyp in cecum.  One 3 mm polyp in transverse colon.  One 3 mm polyp in descending colon.  Diverticulosis in sigmoid colon.  Perianal skin tags.  -Results:  tubular adenoma x3, negative for dysplasia.  Recommendation:  f/u 5yrs    CT CALCIUM SCREENING  03/16/2020    Total Agatston calcium score is 20.     Current Outpatient Medications   Medication Sig Dispense Refill    albuterol (PROAIR HFA/PROVENTIL HFA/VENTOLIN HFA) 108 (90 Base) MCG/ACT inhaler Inhale 2 puffs into the lungs every 6 hours 18 g 1    atorvastatin (LIPITOR) 40 MG tablet Take 1 tablet (40 mg) by mouth daily 90 tablet 3    cetirizine (ZYRTEC) 10 MG tablet Take 1 tablet (10 mg) by mouth daily 90 tablet 3    traZODone (DESYREL) 50 MG tablet Take 1 tablet (50 mg) by mouth  "at bedtime 90 tablet 3    benzonatate (TESSALON) 200 MG capsule Take 1 capsule (200 mg) by mouth 3 times daily as needed for cough 30 capsule 1       No Known Allergies     Social History     Tobacco Use    Smoking status: Former     Years: 3     Types: Cigarettes     Quit date:      Years since quittin.0     Passive exposure: Past    Smokeless tobacco: Never   Substance Use Topics    Alcohol use: Yes       History   Drug Use Unknown         Objective     /77 (BP Location: Right arm, Patient Position: Sitting, Cuff Size: Adult Large)   Pulse 62   Temp 97.4  F (36.3  C) (Tympanic)   Resp 16   Ht 1.702 m (5' 7\")   Wt 85.4 kg (188 lb 3.2 oz)   SpO2 97%   BMI 29.48 kg/m      Physical Exam    GENERAL APPEARANCE: healthy, alert and no distress     EYES: EOMI,  PERRL     HENT: ear canals and TM's normal and nose and mouth without ulcers or lesions     NECK: no adenopathy, no asymmetry, masses, or scars and thyroid normal to palpation     RESP: lungs clear to auscultation - no rales, rhonchi or wheezes     CV: regular rates and rhythm, normal S1 S2, no S3 or S4 and no murmur, click or rub     ABDOMEN:  soft, nontender, no HSM or masses and bowel sounds normal     MS: extremities normal- no gross deformities noted, no evidence of inflammation in joints, FROM in all extremities.     SKIN: no suspicious lesions or rashes     NEURO: Normal strength and tone, sensory exam grossly normal, mentation intact and speech normal     PSYCH: mentation appears normal. and affect normal/bright     LYMPHATICS: No cervical adenopathy    No results for input(s): \"HGB\", \"PLT\", \"INR\", \"NA\", \"POTASSIUM\", \"CR\", \"A1C\" in the last 65786 hours.     Diagnostics:  Recent Results (from the past 24 hour(s))   Basic metabolic panel    Collection Time: 23  9:40 AM   Result Value Ref Range    Sodium 140 135 - 145 mmol/L    Potassium 4.5 3.4 - 5.3 mmol/L    Chloride 105 98 - 107 mmol/L    Carbon Dioxide (CO2) 27 22 - 29 mmol/L    " Anion Gap 8 7 - 15 mmol/L    Urea Nitrogen 25.1 (H) 8.0 - 23.0 mg/dL    Creatinine 0.84 0.67 - 1.17 mg/dL    GFR Estimate >90 >60 mL/min/1.73m2    Calcium 9.7 8.8 - 10.2 mg/dL    Glucose 99 70 - 99 mg/dL   CBC with platelets    Collection Time: 12/20/23  9:40 AM   Result Value Ref Range    WBC Count 5.3 4.0 - 11.0 10e3/uL    RBC Count 5.11 4.40 - 5.90 10e6/uL    Hemoglobin 14.6 13.3 - 17.7 g/dL    Hematocrit 45.8 40.0 - 53.0 %    MCV 90 78 - 100 fL    MCH 28.6 26.5 - 33.0 pg    MCHC 31.9 31.5 - 36.5 g/dL    RDW 13.5 10.0 - 15.0 %    Platelet Count 207 150 - 450 10e3/uL      EKG: sinus bradycardia, normal axis, normal intervals, no acute ST/T changes c/w ischemia, no LVH by voltage criteria, there are no prior tracings available    Revised Cardiac Risk Index (RCRI):  The patient has the following serious cardiovascular risks for perioperative complications:   - No serious cardiac risks = 0 points     RCRI Interpretation: 0 points: Class I (very low risk - 0.4% complication rate)         Signed Electronically by: Brody Stallworth MD  Copy of this evaluation report is provided to requesting physician.

## 2024-07-25 SDOH — HEALTH STABILITY: PHYSICAL HEALTH: ON AVERAGE, HOW MANY DAYS PER WEEK DO YOU ENGAGE IN MODERATE TO STRENUOUS EXERCISE (LIKE A BRISK WALK)?: 3 DAYS

## 2024-07-25 SDOH — HEALTH STABILITY: PHYSICAL HEALTH: ON AVERAGE, HOW MANY MINUTES DO YOU ENGAGE IN EXERCISE AT THIS LEVEL?: 40 MIN

## 2024-07-25 ASSESSMENT — ASTHMA QUESTIONNAIRES
QUESTION_5 LAST FOUR WEEKS HOW WOULD YOU RATE YOUR ASTHMA CONTROL: WELL CONTROLLED
QUESTION_3 LAST FOUR WEEKS HOW OFTEN DID YOUR ASTHMA SYMPTOMS (WHEEZING, COUGHING, SHORTNESS OF BREATH, CHEST TIGHTNESS OR PAIN) WAKE YOU UP AT NIGHT OR EARLIER THAN USUAL IN THE MORNING: NOT AT ALL
ACT_TOTALSCORE: 24
QUESTION_2 LAST FOUR WEEKS HOW OFTEN HAVE YOU HAD SHORTNESS OF BREATH: NOT AT ALL
ACT_TOTALSCORE: 24
QUESTION_4 LAST FOUR WEEKS HOW OFTEN HAVE YOU USED YOUR RESCUE INHALER OR NEBULIZER MEDICATION (SUCH AS ALBUTEROL): NOT AT ALL
QUESTION_1 LAST FOUR WEEKS HOW MUCH OF THE TIME DID YOUR ASTHMA KEEP YOU FROM GETTING AS MUCH DONE AT WORK, SCHOOL OR AT HOME: NONE OF THE TIME

## 2024-07-25 ASSESSMENT — SOCIAL DETERMINANTS OF HEALTH (SDOH): HOW OFTEN DO YOU GET TOGETHER WITH FRIENDS OR RELATIVES?: TWICE A WEEK

## 2024-07-30 ENCOUNTER — OFFICE VISIT (OUTPATIENT)
Dept: FAMILY MEDICINE | Facility: CLINIC | Age: 69
End: 2024-07-30
Payer: MEDICARE

## 2024-07-30 VITALS
TEMPERATURE: 97.3 F | WEIGHT: 179.2 LBS | HEART RATE: 61 BPM | BODY MASS INDEX: 28.12 KG/M2 | OXYGEN SATURATION: 96 % | HEIGHT: 67 IN | RESPIRATION RATE: 18 BRPM | SYSTOLIC BLOOD PRESSURE: 122 MMHG | DIASTOLIC BLOOD PRESSURE: 74 MMHG

## 2024-07-30 DIAGNOSIS — J45.20 MILD INTERMITTENT ASTHMA WITHOUT COMPLICATION: ICD-10-CM

## 2024-07-30 DIAGNOSIS — J30.1 SEASONAL ALLERGIC RHINITIS DUE TO POLLEN: ICD-10-CM

## 2024-07-30 DIAGNOSIS — E78.5 HYPERLIPIDEMIA, UNSPECIFIED HYPERLIPIDEMIA TYPE: ICD-10-CM

## 2024-07-30 DIAGNOSIS — Z80.42 FAMILY HISTORY OF PROSTATE CANCER: ICD-10-CM

## 2024-07-30 DIAGNOSIS — Z80.0 FAMILY HISTORY OF COLON CANCER: ICD-10-CM

## 2024-07-30 DIAGNOSIS — F51.01 PRIMARY INSOMNIA: ICD-10-CM

## 2024-07-30 DIAGNOSIS — Z00.00 ENCOUNTER FOR MEDICARE ANNUAL WELLNESS EXAM: Primary | ICD-10-CM

## 2024-07-30 DIAGNOSIS — Z23 NEED FOR TDAP VACCINATION: ICD-10-CM

## 2024-07-30 DIAGNOSIS — H90.3 SENSORINEURAL HEARING LOSS (SNHL) OF BOTH EARS: ICD-10-CM

## 2024-07-30 DIAGNOSIS — D12.6 ADENOMATOUS POLYP OF COLON, UNSPECIFIED PART OF COLON: ICD-10-CM

## 2024-07-30 PROCEDURE — G0402 INITIAL PREVENTIVE EXAM: HCPCS | Performed by: FAMILY MEDICINE

## 2024-07-30 PROCEDURE — 99214 OFFICE O/P EST MOD 30 MIN: CPT | Mod: 25 | Performed by: FAMILY MEDICINE

## 2024-07-30 RX ORDER — ATORVASTATIN CALCIUM 40 MG/1
40 TABLET, FILM COATED ORAL DAILY
Qty: 90 TABLET | Refills: 3 | Status: SHIPPED | OUTPATIENT
Start: 2024-07-30

## 2024-07-30 RX ORDER — TRAZODONE HYDROCHLORIDE 50 MG/1
75 TABLET, FILM COATED ORAL AT BEDTIME
Qty: 135 TABLET | Refills: 3 | Status: SHIPPED | OUTPATIENT
Start: 2024-07-30

## 2024-07-30 RX ORDER — ALBUTEROL SULFATE 90 UG/1
2 AEROSOL, METERED RESPIRATORY (INHALATION) EVERY 6 HOURS
Qty: 18 G | Refills: 1 | Status: SHIPPED | OUTPATIENT
Start: 2024-07-30

## 2024-07-30 RX ORDER — CETIRIZINE HYDROCHLORIDE 10 MG/1
10 TABLET ORAL DAILY
Qty: 90 TABLET | Refills: 3 | Status: SHIPPED | OUTPATIENT
Start: 2024-07-30

## 2024-07-30 NOTE — PROGRESS NOTES
"Preventive Care Visit  [facility]  Will Morocho MD, Family Medicine  Jul 30, 2024      Assessment & Plan     Encounter for Medicare annual wellness exam  Healthcare maintenance advance directives reviewed    Hyperlipidemia, unspecified hyperlipidemia type  Well-controlled with atorvastatin  - atorvastatin (LIPITOR) 40 MG tablet; Take 1 tablet (40 mg) by mouth daily    Seasonal allergic rhinitis due to pollen  Well-controlled with Zyrtec  - cetirizine (ZYRTEC) 10 MG tablet; Take 1 tablet (10 mg) by mouth daily    Mild intermittent asthma without complication  Very rare use of albuterol    Primary insomnia  Controlled on trazodone  - traZODone (DESYREL) 50 MG tablet; Take 1.5 tablets (75 mg) by mouth at bedtime    Adenomatous polyp of colon, unspecified part of colon  Due for colonoscopy next year    Family history of colon cancer      Family history of prostate cancer  Yearly PSA    Sensorineural hearing loss (SNHL) of both ears  He has hearing aids    Need for Tdap vaccination  Discussed  - Tdap, tetanus-diptheria-acell pertussis, (BOOSTRIX) 5-2.5-18.5 LF-MCG/0.5 SOURAV injection; Inject 0.5 mLs into the muscle once for 1 dose            BMI  Estimated body mass index is 28.07 kg/m  as calculated from the following:    Height as of this encounter: 1.702 m (5' 7\").    Weight as of this encounter: 81.3 kg (179 lb 3.2 oz).       Counseling  Appropriate preventive services were addressed with this patient via screening, questionnaire, or discussion as appropriate for fall prevention, nutrition, physical activity, Tobacco-use cessation, weight loss and cognition.  Checklist reviewing preventive services available has been given to the patient.  Reviewed patient's diet, addressing concerns and/or questions.   He is at risk for lack of exercise and has been provided with information to increase physical activity for the benefit of his well-being.           Silvia Gaspar is a 69 year old, " presenting for the following: Overall doing well.  Continues to work but states is on hours.  Lives independently with his wife Franci.  Avid [place] very pleased with his knee replacement.  Medicare Visit (Welcome to Medicare physical )        7/30/2024     7:56 AM   Additional Questions   Roomed by MATEUS Potter        Health Care Directive  Patient does not have a Health Care Directive or Living Will: Patient states has Advance Directive and will bring in a copy to clinic.    HPI              7/25/2024   General Health   How would you rate your overall physical health? Good   Feel stress (tense, anxious, or unable to sleep) Not at all            7/25/2024   Nutrition   Diet: Regular (no restrictions)            7/25/2024   Exercise   Days per week of moderate/strenous exercise 3 days   Average minutes spent exercising at this level 40 min            7/25/2024   Social Factors   Frequency of gathering with friends or relatives Twice a week   Worry food won't last until get money to buy more No   Food not last or not have enough money for food? No   Do you have housing? (Housing is defined as stable permanent housing and does not include staying ouside in a car, in a tent, in an abandoned building, in an overnight shelter, or couch-surfing.) Yes   Are you worried about losing your housing? No   Lack of transportation? No   Unable to get utilities (heat,electricity)? No            7/25/2024   Fall Risk   Fallen 2 or more times in the past year? No   Trouble with walking or balance? No             7/25/2024   Activities of Daily Living- Home Safety   Needs help with the following daily activites None of the above   Safety concerns in the home None of the above            7/25/2024   Dental   Dentist two times every year? Yes            7/25/2024   Hearing Screening   Hearing concerns? None of the above            7/25/2024   Driving Risk Screening   Patient/family members have  concerns about driving No            2024   General Alertness/Fatigue Screening   Have you been more tired than usual lately? No            2024   Urinary Incontinence Screening   Bothered by leaking urine in past 6 months No            2024   TB Screening   Were you born outside of the [place]? No            Today's PHQ-2 Score:       2024     7:56 AM   PHQ-2 (  [employer])   Q1: Little interest or pleasure in doing things 0   Q2: Feeling down, depressed or hopeless 0   PHQ-2 Score 0   Q1: Little interest or pleasure in doing things Not at all   Q2: Feeling down, depressed or hopeless Not at all   PHQ-2 Score 0           2024   Substance Use   Alcohol more than 3/day or more than 7/wk No   Do you have a current opioid prescription? No   How severe/bad is pain from 1 to 10? 0/10 (No Pain)   Do you use any other substances recreationally? No        Social History     Tobacco Use    Smoking status: Former     Current packs/day: 0.00     Types: Cigarettes     Start date:      Quit date:      Years since quittin.6     Passive exposure: Past    Smokeless tobacco: Never   Vaping Use    Vaping status: Never Used   Substance Use Topics    Alcohol use: Yes    Drug use: Never       ASCVD Risk   The 10-year ASCVD risk score (Noah PALMA, et al., 2019) is: 16.7%    Values used to calculate the score:      Age: 69 years      Sex: Male      Is Non- : No      Diabetic: No      Tobacco smoker: No      Systolic Blood Pressure: 122 mmHg      Is BP treated: No      HDL Cholesterol: 45 mg/dL      Total Cholesterol: 156 mg/dL    Patient Active Problem List   Diagnosis    Adenomatous polyp of colon    Hearing loss    Hyperlipidemia    Mild intermittent asthma    Obesity    Osteoarthritis    Seasonal allergic rhinitis    Squamous cell carcinoma in situ (SCCIS)    Family history of prostate cancer    Family history of colon cancer     Current Outpatient Medications    Medication Sig Dispense Refill    albuterol (PROAIR HFA/PROVENTIL HFA/VENTOLIN HFA) 108 (90 Base) MCG/ACT inhaler Inhale 2 puffs into the lungs every 6 hours 18 g 1    atorvastatin (LIPITOR) 40 MG tablet Take 1 tablet (40 mg) by mouth daily 90 tablet 3    cetirizine (ZYRTEC) 10 MG tablet Take 1 tablet (10 mg) by mouth daily 90 tablet 3    Tdap, tetanus-diptheria-acell pertussis, (BOOSTRIX) 5-2.5-18.5 LF-MCG/0.5 SOURAV injection Inject 0.5 mLs into the muscle once for 1 dose 0.5 mL 0    traZODone (DESYREL) 50 MG tablet Take 1.5 tablets (75 mg) by mouth at bedtime 135 tablet 3     No current facility-administered medications for this visit.             Reviewed and updated as needed this visit by Provider                      Current providers sharing in care for this patient include:  Patient Care Team:  Will Morocho MD as PCP - General (Family Medicine)  Will Morocho MD (Family Practice)  Will Morocho MD as Assigned PCP    The following health maintenance items are reviewed in Epic and correct as of today:  Health Maintenance   Topic Date Due    ASTHMA ACTION PLAN  Never done    RSV VACCINE (Pregnancy & 60+) (1 - 1-dose 60+ series) Never done    DTAP/TDAP/TD IMMUNIZATION (5 - Td or Tdap) 07/26/2023    COVID-19 Vaccine (7 - 2023-24 season) 02/24/2024    INFLUENZA VACCINE (1) 09/01/2024    MEDICARE ANNUAL WELLNESS VISIT  10/24/2024    LIPID  10/24/2024    ANNUAL REVIEW OF HM ORDERS  10/24/2024    ASTHMA CONTROL TEST  01/30/2025    COLORECTAL CANCER SCREENING  03/05/2025    FALL RISK ASSESSMENT  07/30/2025    GLUCOSE  12/20/2026    ADVANCE CARE PLANNING  12/20/2028    PHQ-2 (once per calendar year)  Completed    Pneumococcal Vaccine: 65+ Years  Completed    ZOSTER IMMUNIZATION  Completed    AORTIC ANEURYSM SCREENING (SYSTEM ASSIGNED)  Completed    IPV IMMUNIZATION  Aged Out    HPV IMMUNIZATION  Aged Out    MENINGITIS IMMUNIZATION  Aged Out    RSV MONOCLONAL ANTIBODY  Aged Out    HEPATITIS C  "SCREENING  Discontinued         Review of Systems  Constitutional, HEENT, cardiovascular, pulmonary, gi and gu systems are negative, except as otherwise noted.     Objective    Exam  /74 (BP Location: Right arm, Patient Position: Sitting, Cuff Size: Adult Regular)   Pulse 61   Temp 97.3  F (36.3  C) (Tympanic)   Resp 18   Ht 1.702 m (5' 7\")   Wt 81.3 kg (179 lb 3.2 oz)   SpO2 96%   BMI 28.07 kg/m     Estimated body mass index is 28.07 kg/m  as calculated from the following:    Height as of this encounter: 1.702 m (5' 7\").    Weight as of this encounter: 81.3 kg (179 lb 3.2 oz).    Physical Exam  GENERAL: alert and no distress  NECK: no adenopathy, no asymmetry, masses, or scars  RESP: lungs clear to auscultation - no rales, rhonchi or wheezes  CV: regular rate and rhythm, normal S1 S2, no S3 or S4, no murmur, click or rub, no peripheral edema  ABDOMEN: soft, nontender, no hepatosplenomegaly, no masses and bowel sounds normal  MS: no gross musculoskeletal defects noted, no edema        7/30/2024   Mini Cog   Mini-Cog Not Completed (choose reason) Patient declines          Mini cog testing 5 out of 5 no dementia      Vision Screen  Patient wears corrective lenses (select all that apply): Worn during vision screen  Vision Screen Results: Pass      Signed Electronically by: Will Morocho MD    "

## 2024-12-05 ENCOUNTER — PATIENT OUTREACH (OUTPATIENT)
Dept: GASTROENTEROLOGY | Facility: CLINIC | Age: 69
End: 2024-12-05
Payer: MEDICARE

## 2024-12-05 DIAGNOSIS — Z12.11 SPECIAL SCREENING FOR MALIGNANT NEOPLASMS, COLON: Primary | ICD-10-CM

## 2024-12-05 NOTE — PROGRESS NOTES
"Family hx CRC with 5yr recall recommended on last colonoscopy performed in 2020    CRC Screening Colonoscopy Referral Review    Patient meets the inclusion criteria for screening colonoscopy standing order.    Ordering/Referring Provider:  Will Morocho      BMI: Estimated body mass index is 28.07 kg/m  as calculated from the following:    Height as of 7/30/24: 1.702 m (5' 7\").    Weight as of 7/30/24: 81.3 kg (179 lb 3.2 oz).     Sedation:  Does patient have any of the following conditions affecting sedation?  No medical conditions affecting sedation.    Previous Scopes:  Any previous recommendations or follow up needs based on previous scope?  na / No recommendations.    Medical Concerns to Postpone Order:  Does patient have any of the following medical concerns that should postpone/delay colonoscopy referral?  No medical conditions affecting colonoscopy referral.    Final Referral Details:  Based on patient's medical history patient is appropriate for referral order with moderate sedation. If patient's BMI > 50 do not schedule in ASC.  "

## 2024-12-26 ENCOUNTER — TELEPHONE (OUTPATIENT)
Dept: GASTROENTEROLOGY | Facility: CLINIC | Age: 69
End: 2024-12-26
Payer: MEDICARE

## 2024-12-26 NOTE — TELEPHONE ENCOUNTER
"Endoscopy Scheduling Screen    Have you had any respiratory illness or flu-like symptoms in the last 10 days?  No    What is your communication preference for Instructions and/or Bowel Prep?   MyChart    What insurance is in the chart?  Other:  MEDICARE     Ordering/Referring Provider: NICOLE RECIO    (If ordering provider performs procedure, schedule with ordering provider unless otherwise instructed. )    BMI: Estimated body mass index is 28.07 kg/m  as calculated from the following:    Height as of 7/30/24: 1.702 m (5' 7\").    Weight as of 7/30/24: 81.3 kg (179 lb 3.2 oz).     Sedation Ordered  moderate sedation.   If patient BMI > 50 do not schedule in ASC.    If patient BMI > 45 do not schedule at [facility].    Are you taking methadone or Suboxone?  NO, No RN review required.    Have you been diagnosed and are being treated for severe PTSD or severe anxiety?  NO, No RN review required.    Are you taking any prescription medications for pain 3 or more times per week?   NO, No RN review required.    Do you have a history of malignant hyperthermia?  No    (Females) Are you currently pregnant?        Have you been diagnosed or told you have pulmonary hypertension?   No    Do you have an LVAD?  No    Have you been told you have moderate to severe sleep apnea?  No.    Have you been told you have COPD, asthma, or any other lung disease?  No    Do you have any heart conditions?  No     Have you ever had or are you waiting for an organ transplant?  No. Continue scheduling, no site restrictions.    Have you had a stroke or transient ischemic attack (TIA aka \"mini stroke\" in the last 6 months?   No    Have you been diagnosed with or been told you have cirrhosis of the liver?   No.    Are you currently on dialysis?   No    Do you need assistance transferring?   No    BMI: Estimated body mass index is 28.07 kg/m  as calculated from the following:    Height as of 7/30/24: 1.702 m (5' 7\").    Weight as of 7/30/24: 81.3 kg " (179 lb 3.2 oz).     Is patients BMI > 40 and scheduling location [facility]?  No    Do you take an injectable or oral medication for weight loss or diabetes (excluding insulin)?  No    Do you take the medication Naltrexone?  No    Do you take blood thinners?  No       Prep   Are you currently on dialysis or do you have chronic kidney disease?  No    Do you have a diagnosis of diabetes?  No    Do you have a diagnosis of cystic fibrosis (CF)?  No    On a regular basis do you go 3 -5 days between bowel movements?  No    BMI > 40?  No    Preferred Pharmacy:    [facility] [place] [facility] [place]note 2[place] [address] [address]  Phone: [phone] Fax: [phone]      Final Scheduling Details     Procedure scheduled  Colonoscopy    Surgeon:  ANN-MARIE     Date of procedure:  3/20/25     Pre-OP / PAC:   No - Not required for this site.    Location  [facility] - Patient preference.    Sedation   MAC/Deep Sedation  Per location      Patient Reminders:   You will receive a call from a Nurse to review instructions and health history.  This assessment must be completed prior to your procedure.  Failure to complete the Nurse assessment may result in the procedure being cancelled.      On the day of your procedure, please designate an adult(s) who can drive you home stay with you for the next 24 hours. The medicines used in the exam will make you sleepy. You will not be able to drive.      You cannot take public transportation, ride share services, or non-medical taxi service without a responsible caregiver.  Medical transport services are allowed with the requirement that a responsible caregiver will receive you at your destination.  We require that drivers and caregivers are confirmed prior to your procedure.

## 2025-02-27 ENCOUNTER — TELEPHONE (OUTPATIENT)
Dept: GASTROENTEROLOGY | Facility: CLINIC | Age: 70
End: 2025-02-27
Payer: COMMERCIAL

## 2025-02-27 NOTE — TELEPHONE ENCOUNTER
Bowel Prep Review:  Disclaimer: No call was made to the patient.     Standard Miralax bowel prep.    Recommended due to standard bowel prep.   Instructions were sent via [employer].       Racheal Sims RN  Endoscopy Procedure Pre Assessment

## 2025-03-12 ENCOUNTER — TELEPHONE (OUTPATIENT)
Dept: GASTROENTEROLOGY | Facility: CLINIC | Age: 70
End: 2025-03-12

## 2025-03-12 ENCOUNTER — OFFICE VISIT (OUTPATIENT)
Dept: FAMILY MEDICINE | Facility: CLINIC | Age: 70
End: 2025-03-12
Payer: MEDICARE

## 2025-03-12 VITALS
HEIGHT: 67 IN | SYSTOLIC BLOOD PRESSURE: 114 MMHG | OXYGEN SATURATION: 98 % | TEMPERATURE: 97.6 F | WEIGHT: 190 LBS | BODY MASS INDEX: 29.82 KG/M2 | HEART RATE: 77 BPM | DIASTOLIC BLOOD PRESSURE: 72 MMHG | RESPIRATION RATE: 20 BRPM

## 2025-03-12 DIAGNOSIS — R05.8 COUGH PRESENT FOR GREATER THAN 3 WEEKS: Primary | ICD-10-CM

## 2025-03-12 PROCEDURE — 3074F SYST BP LT 130 MM HG: CPT

## 2025-03-12 PROCEDURE — 3078F DIAST BP <80 MM HG: CPT

## 2025-03-12 PROCEDURE — 99213 OFFICE O/P EST LOW 20 MIN: CPT

## 2025-03-12 RX ORDER — PREDNISONE 20 MG/1
TABLET ORAL
Qty: 20 TABLET | Refills: 0 | Status: SHIPPED | OUTPATIENT
Start: 2025-03-12

## 2025-03-12 RX ORDER — AZITHROMYCIN 250 MG/1
TABLET, FILM COATED ORAL
Qty: 6 TABLET | Refills: 0 | Status: SHIPPED | OUTPATIENT
Start: 2025-03-12 | End: 2025-03-17

## 2025-03-12 ASSESSMENT — ASTHMA QUESTIONNAIRES
QUESTION_1 LAST FOUR WEEKS HOW MUCH OF THE TIME DID YOUR ASTHMA KEEP YOU FROM GETTING AS MUCH DONE AT WORK, SCHOOL OR AT HOME: SOME OF THE TIME
QUESTION_4 LAST FOUR WEEKS HOW OFTEN HAVE YOU USED YOUR RESCUE INHALER OR NEBULIZER MEDICATION (SUCH AS ALBUTEROL): NOT AT ALL
ACT_TOTALSCORE: 17
QUESTION_2 LAST FOUR WEEKS HOW OFTEN HAVE YOU HAD SHORTNESS OF BREATH: ONCE A DAY
ACT_TOTALSCORE: 17
QUESTION_5 LAST FOUR WEEKS HOW WOULD YOU RATE YOUR ASTHMA CONTROL: SOMEWHAT CONTROLLED
QUESTION_3 LAST FOUR WEEKS HOW OFTEN DID YOUR ASTHMA SYMPTOMS (WHEEZING, COUGHING, SHORTNESS OF BREATH, CHEST TIGHTNESS OR PAIN) WAKE YOU UP AT NIGHT OR EARLIER THAN USUAL IN THE MORNING: ONCE OR TWICE

## 2025-03-12 ASSESSMENT — ENCOUNTER SYMPTOMS
CHEST TIGHTNESS: 0
EYE DISCHARGE: 0
SORE THROAT: 0
VOMITING: 0
TROUBLE SWALLOWING: 0
PALPITATIONS: 0
FACIAL SWELLING: 0
COUGH: 1
FEVER: 0
SINUS PAIN: 0
NAUSEA: 0
WHEEZING: 1
SHORTNESS OF BREATH: 1
VOICE CHANGE: 1
MYALGIAS: 0

## 2025-03-12 ASSESSMENT — LIFESTYLE VARIABLES: SMOKING_STATUS: 0

## 2025-03-12 NOTE — TELEPHONE ENCOUNTER
Caller: Hubert Saenz     Reason for Reschedule/Cancellation (please be detailed, any staff messages or encounters to note?):   Conflict with personal schedule    Did you cancel or rescheduled an [facility] procedure? No.    Is screening questionnaire older than 3 months from the reschedule date.   If Yes, please complete screening questionnaire. No    Prior to reschedule please review:  Ordering Provider: NICOLE RECIO   Sedation Determined: MAC~Per location  Does patient have any ASC Exclusions, please identify?: N    Notes on Cancelled Procedure:  Procedure: Lower Endoscopy [Colonoscopy]   Date: 3/20/2025  Location: [facility]; [address] [place] [address], [address] [address]  Surgeon: ANN-MARIE    Rescheduled: Yes,   Procedure: Lower Endoscopy [Colonoscopy]    Date: 3/25/2025   Location: [facility]; [address] [place] [address], [address], [place] [address]   Surgeon: CLARA   Sedation Level Scheduled  MAC ,  Reason for Sedation Level PER LOCATION   Instructions updated and sent: Y     Does patient need PAC or Pre -Op Rescheduled? : N

## 2025-03-12 NOTE — PROGRESS NOTES
"  Assessment & Plan     Cough present for greater than 3 weeks  Cough present for approximately 1 month, he feels that is in his upper airways.  This is confirmed with exam, lung sounds are clear.  He is encouraged to use his inhaler for shortness of breath and wheezing, will prescribe a prednisone taper as well as azithromycin as this has been a recurrent problem and these are the medications that have been effective in the past.  Patient to follow-up with clinic if not improving.  - predniSONE (DELTASONE) 20 MG tablet; Take 3 tabs by mouth daily x 3 days, then 2 tabs daily x 3 days, then 1 tab daily x 3 days, then 1/2 tab daily x 3 days.  - azithromycin (ZITHROMAX) 250 MG tablet; Take 2 tablets (500 mg) by mouth daily for 1 day, THEN 1 tablet (250 mg) daily for 4 days.      BMI  Estimated body mass index is 29.76 kg/m  as calculated from the following:    Height as of this encounter: 1.702 m (5' 7\").    Weight as of this encounter: 86.2 kg (190 lb).             Silvia Gaspar is a 69 year old, presenting for the following health issues:  Cough (Sinus congestion with phlegm x1 month. SOB, wheezing and tightness in the chest. )      3/12/2025     9:20 AM   Additional Questions   Roomed by omayra lino   Accompanied by self     History of Present Illness       Reason for visit:  Cold?  Symptom onset:  More than a month  Symptom intensity:  Moderate  Symptom progression:  Staying the same  Had these symptoms before:  Yes  Has tried/received treatment for these symptoms:  No  What makes it worse:  No  What makes it better:  Yes   He is taking medications regularly.          Asthma Follow-Up    Was ACT completed today?  Yes        3/12/2025     9:15 AM   ACT Total Scores   ACT TOTAL SCORE (Goal Greater than or Equal to 20) 17    In the past 12 months, how many times did you visit the emergency room for your asthma without being admitted to the hospital? 0   In the past 12 months, how many times were you hospitalized " "overnight because of your asthma? 0       Patient-reported       How many days per week do you miss taking your asthma controller medication?  I do not have an asthma controller medication  Please describe any recent triggers for your asthma: Patient is unaware of triggers  Have you had any Emergency Room Visits, Urgent Care Visits, or Hospital Admissions since your last office visit?  No      Review of Systems   Constitutional:  Negative for fever.   HENT:  Positive for congestion, postnasal drip and voice change. Negative for ear pain, facial swelling, sinus pain, sore throat and trouble swallowing.    Eyes:  Negative for discharge.   Respiratory:  Positive for cough, shortness of breath and wheezing. Negative for chest tightness.    Cardiovascular:  Negative for chest pain, palpitations and leg swelling.   Gastrointestinal:  Negative for nausea and vomiting.   Musculoskeletal:  Negative for myalgias.           Objective    /72 (BP Location: Right arm, Patient Position: Sitting)   Pulse 77   Temp 97.6  F (36.4  C) (Tympanic)   Resp 20   Ht 1.702 m (5' 7\")   Wt 86.2 kg (190 lb)   SpO2 98%   BMI 29.76 kg/m    Body mass index is 29.76 kg/m .  Physical Exam   GENERAL: alert and no distress  NECK: no adenopathy, no asymmetry, masses, or scars  RESP: lungs clear to auscultation - no rales, rhonchi or wheezes  CV: regular rate and rhythm, normal S1 S2, no S3 or S4, no murmur, click or rub, no peripheral edema  ABDOMEN: soft, nontender, no hepatosplenomegaly, no masses and bowel sounds normal  MS: no gross musculoskeletal defects noted, no edema            Signed Electronically by: JEANCARLOS Romero CNP    "

## 2025-03-24 ENCOUNTER — ANESTHESIA EVENT (OUTPATIENT)
Dept: SURGERY | Facility: AMBULATORY SURGERY CENTER | Age: 70
End: 2025-03-24
Payer: MEDICARE

## 2025-03-25 ENCOUNTER — ANESTHESIA (OUTPATIENT)
Dept: SURGERY | Facility: AMBULATORY SURGERY CENTER | Age: 70
End: 2025-03-25
Payer: MEDICARE

## 2025-03-25 ENCOUNTER — HOSPITAL ENCOUNTER (OUTPATIENT)
Facility: AMBULATORY SURGERY CENTER | Age: 70
Discharge: HOME | End: 2025-03-25
Attending: SURGERY
Payer: MEDICARE

## 2025-03-25 VITALS
HEIGHT: 69 IN | WEIGHT: 180 LBS | HEART RATE: 61 BPM | DIASTOLIC BLOOD PRESSURE: 83 MMHG | RESPIRATION RATE: 16 BRPM | OXYGEN SATURATION: 100 % | BODY MASS INDEX: 26.66 KG/M2 | SYSTOLIC BLOOD PRESSURE: 124 MMHG | TEMPERATURE: 96.9 F

## 2025-03-25 DIAGNOSIS — Z12.11 SPECIAL SCREENING FOR MALIGNANT NEOPLASMS, COLON: ICD-10-CM

## 2025-03-25 PROCEDURE — 45380 COLONOSCOPY AND BIOPSY: CPT | Performed by: SURGERY

## 2025-03-25 RX ORDER — SODIUM CHLORIDE, SODIUM LACTATE, POTASSIUM CHLORIDE, CALCIUM CHLORIDE 600; 310; 30; 20 MG/100ML; MG/100ML; MG/100ML; MG/100ML
INJECTION, SOLUTION INTRAVENOUS CONTINUOUS
Status: DISCONTINUED | OUTPATIENT
Start: 2025-03-25 | End: 2025-03-26 | Stop reason: HOSPADM

## 2025-03-25 RX ORDER — ONDANSETRON 2 MG/ML
4 INJECTION INTRAMUSCULAR; INTRAVENOUS EVERY 30 MIN PRN
Status: DISCONTINUED | OUTPATIENT
Start: 2025-03-25 | End: 2025-03-26 | Stop reason: HOSPADM

## 2025-03-25 RX ORDER — FENTANYL CITRATE 0.05 MG/ML
25 INJECTION, SOLUTION INTRAMUSCULAR; INTRAVENOUS
Status: DISCONTINUED | OUTPATIENT
Start: 2025-03-25 | End: 2025-03-26 | Stop reason: HOSPADM

## 2025-03-25 RX ORDER — PROPOFOL 10 MG/ML
INJECTION, EMULSION INTRAVENOUS CONTINUOUS PRN
Status: DISCONTINUED | OUTPATIENT
Start: 2025-03-25 | End: 2025-03-25

## 2025-03-25 RX ORDER — ONDANSETRON 2 MG/ML
4 INJECTION INTRAMUSCULAR; INTRAVENOUS
Status: DISCONTINUED | OUTPATIENT
Start: 2025-03-25 | End: 2025-03-26 | Stop reason: HOSPADM

## 2025-03-25 RX ORDER — DEXAMETHASONE SODIUM PHOSPHATE 4 MG/ML
4 INJECTION, SOLUTION INTRA-ARTICULAR; INTRALESIONAL; INTRAMUSCULAR; INTRAVENOUS; SOFT TISSUE
Status: DISCONTINUED | OUTPATIENT
Start: 2025-03-25 | End: 2025-03-26 | Stop reason: HOSPADM

## 2025-03-25 RX ORDER — ONDANSETRON 4 MG/1
4 TABLET, ORALLY DISINTEGRATING ORAL EVERY 30 MIN PRN
Status: DISCONTINUED | OUTPATIENT
Start: 2025-03-25 | End: 2025-03-26 | Stop reason: HOSPADM

## 2025-03-25 RX ORDER — NALOXONE HYDROCHLORIDE 0.4 MG/ML
0.1 INJECTION, SOLUTION INTRAMUSCULAR; INTRAVENOUS; SUBCUTANEOUS
Status: DISCONTINUED | OUTPATIENT
Start: 2025-03-25 | End: 2025-03-26 | Stop reason: HOSPADM

## 2025-03-25 RX ORDER — LIDOCAINE 40 MG/G
CREAM TOPICAL
Status: DISCONTINUED | OUTPATIENT
Start: 2025-03-25 | End: 2025-03-26 | Stop reason: HOSPADM

## 2025-03-25 RX ADMIN — PROPOFOL 140 MCG/KG/MIN: 10 INJECTION, EMULSION INTRAVENOUS at 07:23

## 2025-03-25 RX ADMIN — SODIUM CHLORIDE, SODIUM LACTATE, POTASSIUM CHLORIDE, CALCIUM CHLORIDE: 600; 310; 30; 20 INJECTION, SOLUTION INTRAVENOUS at 06:34

## 2025-03-25 NOTE — DISCHARGE INSTRUCTIONS
Discharge Instructions:    Take you medications as directed and follow up with you primary provider as scheduled.   You should expect to pass air from your rectum after the procedure.   Follow these care guidelines during your recovery for the next 24 hours.   If you have any questions or concerns please contact the provider that performed your procedure.     You were given medicine for sedation:  You have been given medicines during your procedure that might make you sleepy and weak. To prevent problems:    *Rest for the rest of the day after you are home. You should be back to your normal activity tomorrow.  *For the next 24 hours:   -Do not drink alcoholic beverages.   -Do not make any important decisions or sign any legal forms.   -Do not work around machinery or power equipment.     The medicines used for sedation may make you feel nauseated.   *Start with clear liquids, such as tea, jell-o, broth and ginger ale. As you feel better you may add soft foods such as pudding and ice cream.   *When you no longer feel nauseated, you may try your normal diet.     You should be back to eating your normal after 24 hours.     Call if you have any of these problems:  *Fever of 101 degree F or 38 degrees C  *Bleeding from the rectum  *Black stool or blood in your bowel movements  *Nausea with vomiting that does not ease after a few hours.  *Abdominal pain or bloating  *Fainting     Next colonoscopy in 5 years.    If you have any questions or concerns regarding your procedure please contact Dr. Lloyd, his office number is [phone]

## 2025-03-25 NOTE — OP NOTE
COLONOSCOPY REPORT      Pre-op Dx:              Screening colonoscopy, family history for colon cancer      Procedure:             Colonoscopy with biopsy      Indications:            Colon Cancer Screening      Findings:                Normal colonoscopy    Procedure:              The patient is brought to the endoscopy suite placed in a lateral position and the patient is given conscious sedation anesthesia.  The colonoscope was advanced atraumatically into the anus taken all way up and around to the cecum.  The ileocecal valve and the appendiceal orifice are clearly identified.  The ileocecal valve was intubated and the distal 20 cm of the terminal ileum were evaluated.  A biopsy was taken in the terminal ileum.  The scope was slowly drawn back no lesions seen in the cecum or the ascending colon no lesions seen in the transverse colon no lesions in the descending or sigmoid colon.  There were a few mild sigmoid diverticuli.  The scope was drawn back into the rectum and retroflexed I do not see evidence for any significant hemorrhoidal disease.  The colonoscope was straightened and taken up to the splenic flexure areas aspirated from the left side of the colon as the scope was withdrawn.    Withdrawal Time:  8 minutes    EBL:                        None      Medications:         MAC; see anesthesia note for details          Complications:      None      Post-op Dx:            Normal Colonoscopy      Recommendation:   Next Colonoscopy in 5 years because of his positive family history for colon cancer; the year 2030      Javon Lloyd MD  3/25/2025 7:40 AM  [facility]  [phone]

## 2025-03-25 NOTE — H&P
PRE COLONOSCOPY EVALUATION   H&P       ASSESSMENT     Hubert Saenz is a 70 year old male who is in today for a Screening Colonoscopy.  The patient has not had a stool screening test.         PLAN      Screening Colonoscopy         HPI     Hubert Saenz is a 70 year old male who presents for a colonoscopy.  They do have a family history of colon cancer; Mother and paternal Grandfather  They do have a history of polyps  They have not been loosing weight  They have not noted any change in bowel habits   They have not had blood in their stool.         PAST MEDICAL HISTORY SURGICAL HISTORY     Past Medical History:   Diagnosis Date    Pneumonia     Uncomplicated asthma     Past Surgical History:   Procedure Laterality Date    APPENDECTOMY      no date    ARTHROSCOPY ANKLE      no date    BIOPSY OF SKIN LESION  01/05/2017    Left distal calf, right knee, right superior vale of antihelix, right lateral temple.    COLONOSCOPY      12/2009    COLONOSCOPY  03/25/2015    Single divertiulum, Small tubular adenoma 50 cm   Repeat in 5 years    COLONOSCOPY  03/05/2020    Impression: One 2 mm polyp in cecum.  One 3 mm polyp in transverse colon.  One 3 mm polyp in descending colon.  Diverticulosis in sigmoid colon.  Perianal skin tags.  -Results:  tubular adenoma x3, negative for dysplasia.  Recommendation:  f/u 5yrs    CT CALCIUM SCREENING  03/16/2020    Total Agatston calcium score is 20.          CURRENT MEDICATIONS ALLERGIES     Current Outpatient Rx   Medication Sig Dispense Refill    albuterol (PROAIR HFA/PROVENTIL HFA/VENTOLIN HFA) 108 (90 Base) MCG/ACT inhaler Inhale 2 puffs into the lungs every 6 hours as needed for shortness of breath, wheezing or cough. 18 g 1    atorvastatin (LIPITOR) 40 MG tablet Take 1 tablet (40 mg) by mouth daily 90 tablet 3    benzonatate (TESSALON) 200 MG capsule TAKE ONE CAPSULE BY MOUTH THREE TIMES A DAY AS NEEDED FOR COUGH 30 capsule 1    cetirizine (ZYRTEC) 10 MG tablet Take 1 tablet (10  mg) by mouth daily 90 tablet 3    predniSONE (DELTASONE) 20 MG tablet Take 3 tabs by mouth daily x 3 days, then 2 tabs daily x 3 days, then 1 tab daily x 3 days, then 1/2 tab daily x 3 days. 20 tablet 0    albuterol (PROAIR HFA/PROVENTIL HFA/VENTOLIN HFA) 108 (90 Base) MCG/ACT inhaler Inhale 2 puffs into the lungs every 6 hours (Patient not taking: Reported on 3/12/2025) 18 g 1    traZODone (DESYREL) 50 MG tablet Take 1.5 tablets (75 mg) by mouth at bedtime (Patient taking differently: Take 100 mg by mouth at bedtime.) 135 tablet 3    No Known Allergies       FAMILY HISTORY     Family History   Problem Relation Age of Onset    Colon Cancer Mother     Prostate Cancer Father     Other Cancer Sister     Other Cancer Sister          SOCIAL HISTORY     Social History     Socioeconomic History    Marital status:      Spouse name: None    Number of children: None    Years of education: None    Highest education level: None   Tobacco Use    Smoking status: Former     Current packs/day: 0.00     Types: Cigarettes     Start date:      Quit date:      Years since quittin.2     Passive exposure: Past    Smokeless tobacco: Never   Vaping Use    Vaping status: Never Used   Substance and Sexual Activity    Alcohol use: Yes    Drug use: Never     Social Drivers of Health     Financial Resource Strain: Low Risk  (2024)    Financial Resource Strain     Within the past 12 months, have you or your family members you live with been unable to get utilities (heat, electricity) when it was really needed?: No   Food Insecurity: Low Risk  (2024)    Food Insecurity     Within the past 12 months, did you worry that your food would run out before you got money to buy more?: No     Within the past 12 months, did the food you bought just not last and you didn t have money to get more?: No   Transportation Needs: Low Risk  (2024)    Transportation Needs     Within the past 12 months, has lack of transportation  "kept you from medical appointments, getting your medicines, non-medical meetings or appointments, work, or from getting things that you need?: No   Physical Activity: Insufficiently Active (7/25/2024)    Exercise Vital Sign     Days of Exercise per Week: 3 days     Minutes of Exercise per Session: 40 min   Stress: No Stress Concern Present (7/25/2024)    [employer] [place] [employer] - Occupational Stress Questionnaire     Feeling of Stress : Not at all   Social Connections: Unknown (7/25/2024)    Social Connection and Isolation Panel [NHANES]     Frequency of Social Gatherings with Friends and Family: Twice a week   Interpersonal Safety: Low Risk  (3/20/2025)    Interpersonal Safety     Do you feel physically and emotionally safe where you currently live?: Yes     Within the past 12 months, have you been hit, slapped, kicked or otherwise physically hurt by someone?: No     Within the past 12 months, have you been humiliated or emotionally abused in other ways by your partner or ex-partner?: No   Housing Stability: Low Risk  (7/25/2024)    Housing Stability     Do you have housing? : Yes     Are you worried about losing your housing?: No         REVIEW OF SYSTEMS       10 point review of systems are unremarkable except for the symptoms described in the HPI    PHYSICAL EXAM     VITAL SIGNS: /82   Temp 97  F (36.1  C) (Temporal)   Resp 16   Ht 1.753 m (5' 9\")   Wt 81.6 kg (180 lb)   SpO2 97%   BMI 26.58 kg/m     General : Alert, cooperative, appears stated age   Head: Normocephalic, without obvious abnormality,   HEENT:  conjunctiva/corneas clear, EOM's intact, no scleral icterus   Lungs: Clear to auscultation bilaterally, respirations unlabored  Heart: Regular rate and rhythm, S1, S2 normal, no murmur, rub or gallop   Abdomen: Soft, non-tender, no guarding, + bowel sounds active,   Extremities : No obvious swelling,  Neurologic:  moves all extremities to command, no focal findings    Airway: " Class 2  ASA:   2      [facility]  [phone]

## 2025-03-25 NOTE — ANESTHESIA CARE TRANSFER NOTE
Patient: Hubert Saenz    Procedure: Procedure(s):  COLONOSCOPY WITH POLYPECTOMY       Diagnosis: Special screening for malignant neoplasms, colon [Z12.11]  Diagnosis Additional Information: No value filed.    Anesthesia Type:   MAC     Note:    Oropharynx: oropharynx clear of all foreign objects  Level of Consciousness: awake and drowsy  Oxygen Supplementation: blow-by O2 and face mask  Level of Supplemental Oxygen (L/min / FiO2): 8  Independent Airway: airway patency satisfactory and stable  Dentition: dentition unchanged  Vital Signs Stable: post-procedure vital signs reviewed and stable  Report to RN Given: handoff report given  Patient transferred to: Phase II    Handoff Report: Identifed the Patient, Identified the Reponsible Provider, Reviewed the pertinent medical history, Discussed the surgical course, Reviewed Intra-OP anesthesia mangement and issues during anesthesia, Set expectations for post-procedure period and Allowed opportunity for questions and acknowledgement of understanding      Vitals:  Vitals Value Taken Time   /59 03/25/25 0738   Temp 96.9  F (36.1  C) 03/25/25 0738   Pulse 67 03/25/25 0738   Resp 20 03/25/25 0738   SpO2 97 % 03/25/25 0738   Vitals shown include unfiled device data.    Electronically Signed By: JEANCARLOS Coley CRNA  March 25, 2025  7:40 AM

## 2025-03-25 NOTE — ANESTHESIA PREPROCEDURE EVALUATION
Anesthesia Pre-Procedure Evaluation    Patient: Hubert Saenz   MRN: [id number] : 1955        Procedure : Procedure(s):  COLONOSCOPY          Past Medical History:   Diagnosis Date    Pneumonia     Uncomplicated asthma       Past Surgical History:   Procedure Laterality Date    APPENDECTOMY      no date    ARTHROSCOPY ANKLE      no date    BIOPSY OF SKIN LESION  2017    Left distal calf, right knee, right superior vale of antihelix, right lateral temple.    COLONOSCOPY      2009    COLONOSCOPY  2015    Single divertiulum, Small tubular adenoma 50 cm   Repeat in 5 years    COLONOSCOPY  2020    Impression: One 2 mm polyp in cecum.  One 3 mm polyp in transverse colon.  One 3 mm polyp in descending colon.  Diverticulosis in sigmoid colon.  Perianal skin tags.  -Results:  tubular adenoma x3, negative for dysplasia.  Recommendation:  f/u 5yrs    CT CALCIUM SCREENING  2020    Total Agatston calcium score is 20.      No Known Allergies   Social History     Tobacco Use    Smoking status: Former     Current packs/day: 0.00     Types: Cigarettes     Start date:      Quit date:      Years since quittin.2     Passive exposure: Past    Smokeless tobacco: Never   Substance Use Topics    Alcohol use: Yes      Wt Readings from Last 1 Encounters:   25 81.6 kg (180 lb)        Anesthesia Evaluation            ROS/MED HX  ENT/Pulmonary:     (+)                      asthma        recent URI (lingering cough, recently prescribed prednisone and zpack),          Neurologic:       Cardiovascular:       METS/Exercise Tolerance:     Hematologic:       Musculoskeletal:       GI/Hepatic:     (+)        bowel prep,            Renal/Genitourinary:       Endo:       Psychiatric/Substance Use:       Infectious Disease:       Malignancy:       Other:            Physical Exam    Airway        Mallampati: II       Respiratory Devices and Support         Dental           Cardiovascular    "cardiovascular exam normal          Pulmonary   pulmonary exam normal                OUTSIDE LABS:  CBC:   Lab Results   Component Value Date    WBC 5.3 12/20/2023    HGB 14.6 12/20/2023    HCT 45.8 12/20/2023     12/20/2023     BMP:   Lab Results   Component Value Date     12/20/2023    POTASSIUM 4.5 12/20/2023    CHLORIDE 105 12/20/2023    CO2 27 12/20/2023    BUN 25.1 (H) 12/20/2023    CR 0.84 12/20/2023    GLC 99 12/20/2023     (H) 10/24/2023     COAGS: No results found for: \"PTT\", \"INR\", \"FIBR\"  POC: No results found for: \"BGM\", \"HCG\", \"HCGS\"  HEPATIC: No results found for: \"ALBUMIN\", \"PROTTOTAL\", \"ALT\", \"AST\", \"GGT\", \"ALKPHOS\", \"BILITOTAL\", \"BILIDIRECT\", \"BENNY\"  OTHER:   Lab Results   Component Value Date    ROGER 9.7 12/20/2023       Anesthesia Plan    ASA Status:  2    NPO Status:  NPO Appropriate    Anesthesia Type: MAC.              Consents    Anesthesia Plan(s) and associated risks, benefits, and realistic alternatives discussed. Questions answered and patient/representative(s) expressed understanding.     - Discussed:     - Discussed with:  Patient            Postoperative Care       PONV prophylaxis: Dexamethasone or Solumedrol     Comments:    Other Comments: 10mg prednisone given recent steroid burst.            Scotty Shin MD    Clinically Significant Risk Factors Present on Admission                             # Overweight: Estimated body mass index is 26.58 kg/m  as calculated from the following:    Height as of this encounter: 1.753 m (5' 9\").    Weight as of this encounter: 81.6 kg (180 lb).       # Asthma: noted on problem list          "

## 2025-03-25 NOTE — ANESTHESIA POSTPROCEDURE EVALUATION
Patient: Hubert Saenz    Procedure: Procedure(s):  COLONOSCOPY WITH POLYPECTOMY       Anesthesia Type:  MAC    Note:     Postop Pain Control: Uneventful            Sign Out: Well controlled pain   PONV: No   Neuro/Psych: Uneventful            Sign Out: Acceptable/Baseline neuro status   Airway/Respiratory: Uneventful            Sign Out: Acceptable/Baseline resp. status   CV/Hemodynamics: Uneventful            Sign Out: Acceptable CV status; No obvious hypovolemia; No obvious fluid overload   Other NRE: NONE   DID A NON-ROUTINE EVENT OCCUR? No           Last vitals:  Vitals Value Taken Time   /83 03/25/25 0815   Temp 96.9  F (36.1  C) 03/25/25 0738   Pulse 61 03/25/25 0824   Resp 16 03/25/25 0745   SpO2 100 % 03/25/25 0824   Vitals shown include unfiled device data.    Electronically Signed By: Scotty Shin MD  March 25, 2025  9:13 AM

## 2025-08-09 DIAGNOSIS — J30.1 SEASONAL ALLERGIC RHINITIS DUE TO POLLEN: ICD-10-CM

## 2025-08-09 DIAGNOSIS — E78.5 HYPERLIPIDEMIA, UNSPECIFIED HYPERLIPIDEMIA TYPE: ICD-10-CM

## 2025-08-09 DIAGNOSIS — F51.01 PRIMARY INSOMNIA: ICD-10-CM

## 2025-08-11 RX ORDER — CETIRIZINE HYDROCHLORIDE 10 MG/1
10 TABLET ORAL DAILY
Qty: 60 TABLET | Refills: 0 | Status: SHIPPED | OUTPATIENT
Start: 2025-08-11

## 2025-08-11 RX ORDER — ATORVASTATIN CALCIUM 40 MG/1
40 TABLET, FILM COATED ORAL DAILY
Qty: 60 TABLET | Refills: 0 | Status: SHIPPED | OUTPATIENT
Start: 2025-08-11

## 2025-08-11 RX ORDER — TRAZODONE HYDROCHLORIDE 50 MG/1
TABLET ORAL
Qty: 90 TABLET | Refills: 0 | Status: SHIPPED | OUTPATIENT
Start: 2025-08-11

## 2025-08-31 ENCOUNTER — HEALTH MAINTENANCE LETTER (OUTPATIENT)
Age: 70
End: 2025-08-31